# Patient Record
Sex: MALE | Race: WHITE | NOT HISPANIC OR LATINO | URBAN - METROPOLITAN AREA
[De-identification: names, ages, dates, MRNs, and addresses within clinical notes are randomized per-mention and may not be internally consistent; named-entity substitution may affect disease eponyms.]

---

## 2018-10-30 ENCOUNTER — INPATIENT (INPATIENT)
Facility: HOSPITAL | Age: 78
LOS: 1 days | Discharge: HOME | End: 2018-11-01
Attending: INTERNAL MEDICINE | Admitting: INTERNAL MEDICINE

## 2018-10-30 VITALS
HEART RATE: 87 BPM | RESPIRATION RATE: 18 BRPM | SYSTOLIC BLOOD PRESSURE: 143 MMHG | DIASTOLIC BLOOD PRESSURE: 84 MMHG | WEIGHT: 244.93 LBS | OXYGEN SATURATION: 99 % | TEMPERATURE: 96 F

## 2018-10-30 DIAGNOSIS — L03.211 CELLULITIS OF FACE: ICD-10-CM

## 2018-10-30 LAB
ALBUMIN SERPL ELPH-MCNC: 4.5 G/DL — SIGNIFICANT CHANGE UP (ref 3.5–5.2)
ALP SERPL-CCNC: 34 U/L — SIGNIFICANT CHANGE UP (ref 30–115)
ALT FLD-CCNC: 21 U/L — SIGNIFICANT CHANGE UP (ref 0–41)
ANION GAP SERPL CALC-SCNC: 16 MMOL/L — HIGH (ref 7–14)
APTT BLD: 26.4 SEC — LOW (ref 27–39.2)
AST SERPL-CCNC: 46 U/L — HIGH (ref 0–41)
BASE EXCESS BLDV CALC-SCNC: 0.4 MMOL/L — SIGNIFICANT CHANGE UP (ref -2–2)
BASOPHILS # BLD AUTO: 0 K/UL — SIGNIFICANT CHANGE UP (ref 0–0.2)
BASOPHILS NFR BLD AUTO: 0 % — SIGNIFICANT CHANGE UP (ref 0–1)
BILIRUB SERPL-MCNC: 1 MG/DL — SIGNIFICANT CHANGE UP (ref 0.2–1.2)
BUN SERPL-MCNC: 17 MG/DL — SIGNIFICANT CHANGE UP (ref 10–20)
CA-I SERPL-SCNC: 1.3 MMOL/L — SIGNIFICANT CHANGE UP (ref 1.12–1.3)
CALCIUM SERPL-MCNC: 9.7 MG/DL — SIGNIFICANT CHANGE UP (ref 8.5–10.1)
CHLORIDE SERPL-SCNC: 102 MMOL/L — SIGNIFICANT CHANGE UP (ref 98–110)
CO2 SERPL-SCNC: 20 MMOL/L — SIGNIFICANT CHANGE UP (ref 17–32)
CREAT SERPL-MCNC: 1.4 MG/DL — SIGNIFICANT CHANGE UP (ref 0.7–1.5)
EOSINOPHIL # BLD AUTO: 0.04 K/UL — SIGNIFICANT CHANGE UP (ref 0–0.7)
EOSINOPHIL NFR BLD AUTO: 1 % — SIGNIFICANT CHANGE UP (ref 0–8)
GAS PNL BLDV: 137 MMOL/L — SIGNIFICANT CHANGE UP (ref 136–145)
GAS PNL BLDV: SIGNIFICANT CHANGE UP
GLUCOSE SERPL-MCNC: 114 MG/DL — HIGH (ref 70–99)
HCO3 BLDV-SCNC: 25 MMOL/L — SIGNIFICANT CHANGE UP (ref 22–29)
HCT VFR BLD CALC: 43.1 % — SIGNIFICANT CHANGE UP (ref 42–52)
HCT VFR BLDA CALC: 47.6 % — HIGH (ref 34–44)
HGB BLD CALC-MCNC: 15.5 G/DL — SIGNIFICANT CHANGE UP (ref 14–18)
HGB BLD-MCNC: 14.9 G/DL — SIGNIFICANT CHANGE UP (ref 14–18)
HOROWITZ INDEX BLDV+IHG-RTO: 21 — SIGNIFICANT CHANGE UP
INR BLD: 1.22 RATIO — SIGNIFICANT CHANGE UP (ref 0.65–1.3)
LACTATE BLDV-MCNC: 1.7 MMOL/L — HIGH (ref 0.5–1.6)
LACTATE SERPL-SCNC: 1.8 MMOL/L — SIGNIFICANT CHANGE UP (ref 0.5–2.2)
LYMPHOCYTES # BLD AUTO: 0.58 K/UL — LOW (ref 1.2–3.4)
LYMPHOCYTES # BLD AUTO: 14 % — LOW (ref 20.5–51.1)
MCHC RBC-ENTMCNC: 30.3 PG — SIGNIFICANT CHANGE UP (ref 27–31)
MCHC RBC-ENTMCNC: 34.6 G/DL — SIGNIFICANT CHANGE UP (ref 32–37)
MCV RBC AUTO: 87.8 FL — SIGNIFICANT CHANGE UP (ref 80–94)
MONOCYTES # BLD AUTO: 0.37 K/UL — SIGNIFICANT CHANGE UP (ref 0.1–0.6)
MONOCYTES NFR BLD AUTO: 9 % — SIGNIFICANT CHANGE UP (ref 1.7–9.3)
NEUTROPHILS # BLD AUTO: 3.15 K/UL — SIGNIFICANT CHANGE UP (ref 1.4–6.5)
NEUTROPHILS NFR BLD AUTO: 76 % — HIGH (ref 42.2–75.2)
NRBC # BLD: 0 /100 — SIGNIFICANT CHANGE UP (ref 0–0)
NRBC # BLD: SIGNIFICANT CHANGE UP /100 WBCS (ref 0–0)
PCO2 BLDV: 40 MMHG — LOW (ref 41–51)
PH BLDV: 7.4 — SIGNIFICANT CHANGE UP (ref 7.26–7.43)
PLAT MORPH BLD: NORMAL — SIGNIFICANT CHANGE UP
PLATELET # BLD AUTO: 127 K/UL — LOW (ref 130–400)
PO2 BLDV: 36 MMHG — SIGNIFICANT CHANGE UP (ref 20–40)
POTASSIUM BLDV-SCNC: 3.7 MMOL/L — SIGNIFICANT CHANGE UP (ref 3.3–5.6)
POTASSIUM SERPL-MCNC: 5.5 MMOL/L — HIGH (ref 3.5–5)
POTASSIUM SERPL-SCNC: 5.5 MMOL/L — HIGH (ref 3.5–5)
PROT SERPL-MCNC: 7.2 G/DL — SIGNIFICANT CHANGE UP (ref 6–8)
PROTHROM AB SERPL-ACNC: 13.2 SEC — HIGH (ref 9.95–12.87)
RBC # BLD: 4.91 M/UL — SIGNIFICANT CHANGE UP (ref 4.7–6.1)
RBC # FLD: 13.2 % — SIGNIFICANT CHANGE UP (ref 11.5–14.5)
RBC BLD AUTO: NORMAL — SIGNIFICANT CHANGE UP
SAO2 % BLDV: 69 % — SIGNIFICANT CHANGE UP
SODIUM SERPL-SCNC: 138 MMOL/L — SIGNIFICANT CHANGE UP (ref 135–146)
WBC # BLD: 4.14 K/UL — LOW (ref 4.8–10.8)
WBC # FLD AUTO: 4.14 K/UL — LOW (ref 4.8–10.8)

## 2018-10-30 RX ORDER — VANCOMYCIN HCL 1 G
VIAL (EA) INTRAVENOUS
Qty: 0 | Refills: 0 | Status: DISCONTINUED | OUTPATIENT
Start: 2018-10-30 | End: 2018-10-31

## 2018-10-30 RX ORDER — VANCOMYCIN HCL 1 G
1000 VIAL (EA) INTRAVENOUS EVERY 12 HOURS
Qty: 0 | Refills: 0 | Status: DISCONTINUED | OUTPATIENT
Start: 2018-10-31 | End: 2018-10-31

## 2018-10-30 RX ORDER — FENOFIBRATE,MICRONIZED 130 MG
145 CAPSULE ORAL DAILY
Qty: 0 | Refills: 0 | Status: DISCONTINUED | OUTPATIENT
Start: 2018-10-30 | End: 2018-11-01

## 2018-10-30 RX ORDER — TAMSULOSIN HYDROCHLORIDE 0.4 MG/1
0.4 CAPSULE ORAL AT BEDTIME
Qty: 0 | Refills: 0 | Status: DISCONTINUED | OUTPATIENT
Start: 2018-10-30 | End: 2018-11-01

## 2018-10-30 RX ORDER — AMPICILLIN SODIUM AND SULBACTAM SODIUM 250; 125 MG/ML; MG/ML
1.5 INJECTION, POWDER, FOR SUSPENSION INTRAMUSCULAR; INTRAVENOUS ONCE
Qty: 0 | Refills: 0 | Status: COMPLETED | OUTPATIENT
Start: 2018-10-30 | End: 2018-10-30

## 2018-10-30 RX ORDER — SODIUM CHLORIDE 9 MG/ML
1000 INJECTION INTRAMUSCULAR; INTRAVENOUS; SUBCUTANEOUS ONCE
Qty: 0 | Refills: 0 | Status: COMPLETED | OUTPATIENT
Start: 2018-10-30 | End: 2018-10-30

## 2018-10-30 RX ORDER — APIXABAN 2.5 MG/1
2.5 TABLET, FILM COATED ORAL EVERY 12 HOURS
Qty: 0 | Refills: 0 | Status: DISCONTINUED | OUTPATIENT
Start: 2018-10-30 | End: 2018-11-01

## 2018-10-30 RX ORDER — ATORVASTATIN CALCIUM 80 MG/1
10 TABLET, FILM COATED ORAL AT BEDTIME
Qty: 0 | Refills: 0 | Status: DISCONTINUED | OUTPATIENT
Start: 2018-10-30 | End: 2018-11-01

## 2018-10-30 RX ORDER — AMPICILLIN SODIUM AND SULBACTAM SODIUM 250; 125 MG/ML; MG/ML
1.5 INJECTION, POWDER, FOR SUSPENSION INTRAMUSCULAR; INTRAVENOUS EVERY 6 HOURS
Qty: 0 | Refills: 0 | Status: DISCONTINUED | OUTPATIENT
Start: 2018-10-31 | End: 2018-10-31

## 2018-10-30 RX ORDER — AMPICILLIN SODIUM AND SULBACTAM SODIUM 250; 125 MG/ML; MG/ML
INJECTION, POWDER, FOR SUSPENSION INTRAMUSCULAR; INTRAVENOUS
Qty: 0 | Refills: 0 | Status: DISCONTINUED | OUTPATIENT
Start: 2018-10-30 | End: 2018-10-31

## 2018-10-30 RX ORDER — VANCOMYCIN HCL 1 G
1000 VIAL (EA) INTRAVENOUS ONCE
Qty: 0 | Refills: 0 | Status: COMPLETED | OUTPATIENT
Start: 2018-10-30 | End: 2018-10-30

## 2018-10-30 RX ORDER — METOPROLOL TARTRATE 50 MG
25 TABLET ORAL
Qty: 0 | Refills: 0 | Status: DISCONTINUED | OUTPATIENT
Start: 2018-10-30 | End: 2018-11-01

## 2018-10-30 RX ORDER — AMLODIPINE BESYLATE 2.5 MG/1
5 TABLET ORAL DAILY
Qty: 0 | Refills: 0 | Status: DISCONTINUED | OUTPATIENT
Start: 2018-10-30 | End: 2018-11-01

## 2018-10-30 RX ADMIN — SODIUM CHLORIDE 1000 MILLILITER(S): 9 INJECTION INTRAMUSCULAR; INTRAVENOUS; SUBCUTANEOUS at 12:16

## 2018-10-30 RX ADMIN — APIXABAN 2.5 MILLIGRAM(S): 2.5 TABLET, FILM COATED ORAL at 17:57

## 2018-10-30 RX ADMIN — ATORVASTATIN CALCIUM 10 MILLIGRAM(S): 80 TABLET, FILM COATED ORAL at 21:45

## 2018-10-30 RX ADMIN — Medication 25 MILLIGRAM(S): at 17:57

## 2018-10-30 RX ADMIN — Medication 250 MILLIGRAM(S): at 14:46

## 2018-10-30 RX ADMIN — AMPICILLIN SODIUM AND SULBACTAM SODIUM 200 GRAM(S): 250; 125 INJECTION, POWDER, FOR SUSPENSION INTRAMUSCULAR; INTRAVENOUS at 14:46

## 2018-10-30 RX ADMIN — TAMSULOSIN HYDROCHLORIDE 0.4 MILLIGRAM(S): 0.4 CAPSULE ORAL at 21:45

## 2018-10-30 NOTE — ED PROVIDER NOTE - SKIN, MLM
There is an area of erythema with warmth and tenderness to the forehead 6x6 cm which has extended to the bilateral eyelids. Skin normal color for race, warm, dry and intact. No evidence of rash.

## 2018-10-30 NOTE — ED PROVIDER NOTE - PROGRESS NOTE DETAILS
Patient mentating well, in no distress. Discussed lab work, discussed admission for IV abx, patient agrees with plan
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
## 2018-10-30 NOTE — ED PROVIDER NOTE - EYES, MLM
Clear bilaterally, pupils equal, round and reactive to light, EOMI, no diplopia, no pain with eye movement

## 2018-10-30 NOTE — H&P ADULT - NSHPPHYSICALEXAM_GEN_ALL_CORE
GENERAL:  79y/o Male NAD, resting comfortably.  HEAD:  Atraumatic, Normocephalic, well healed abrasions with erythema, +warmth.  EYES: EOMI, PERRLA, conjunctiva and sclera clear + orbital edema b/l  NECK: Supple, No JVD, no cervical lymphadenopathy, non-tender  CHEST/LUNG: Clear to auscultation bilaterally; No wheeze, rhonchi, or rales  HEART: Regular rate and rhythm; S1&S2  ABDOMEN: Soft, Nontender, Nondistended x 4 quadrants; Bowel sounds present  EXTREMITIES:   Peripheral Pulses Present, No clubbing, no cyanosis, or no edema, no calf tenderness  PSYCH: AAOx3, cooperative, appropriate  NEUROLOGY: WNL  SKIN: There is an area of erythema with warmth and tenderness to the forehead 6x6 cm which has extended to the bilateral eyelids. Skin normal color for race, warm, dry and intact. No evidence of rash. GENERAL:  79y/o Male NAD, resting comfortably.  HEAD:  Atraumatic, Normocephalic, well healed abrasions with erythema, +warmth.  EYES: EOMI, PERRLA, conjunctiva and sclera clear + orbital edema b/l  NECK: Supple, No JVD, no cervical lymphadenopathy, non-tender  CHEST/LUNG: Clear to auscultation bilaterally; No wheeze, rhonchi, or rales  CV/HEART: Regular rate and rhythm; S1&S2  GI/ABDOMEN: Soft, Nontender, Nondistended x 4 quadrants; Bowel sounds present  EXTREMITIES:   Peripheral Pulses Present, No clubbing, no cyanosis, or no edema, no calf tenderness  PSYCH: AAOx3, cooperative, appropriate  NEUROLOGY: WNL  SKIN: There is an area of erythema with warmth and tenderness to the forehead 6x6 cm which has extended to the bilateral eyelids. Skin normal color for race, warm, dry and intact. No evidence of rash.

## 2018-10-30 NOTE — H&P ADULT - NSHPLABSRESULTS_GEN_ALL_CORE
14.9   4.14  )-----------( 127      ( 30 Oct 2018 11:00 )             43.1       10-30    138  |  102  |  17  ----------------------------<  114<H>  5.5<H>   |  20  |  1.4    Ca    9.7      30 Oct 2018 11:00    TPro  7.2  /  Alb  4.5  /  TBili  1.0  /  DBili  x   /  AST  46<H>  /  ALT  21  /  AlkPhos  34  10-30          PT/INR - ( 30 Oct 2018 11:00 )   PT: 13.20 sec;   INR: 1.22 ratio         PTT - ( 30 Oct 2018 11:00 )  PTT:26.4 sec    Lactate Trend  10-30 @ 11:00 Lactate:1.8     < from: CT Head No Cont (10.30.18 @ 13:37) >      IMPRESSION:     1.  Mild frontal extra calvarial soft tissue swelling with no acute   underlying fractures or dislocations.     2.  Periventricular and subcortical white matter chronic small vessel   ischemic changes.    3.  No acute mass effect, midline shift or hemorrhage.        LULU NGUYỄN M.D., ATTENDING RADIOLOGIST  This document has been electronically signed. Oct 30 2018  1:46PM    < from: CT Maxillofacial w/ IV Cont (10.30.18 @ 13:37) >    IMPRESSION:    1.  Mild frontal extracalvarial soft tissue swelling with no acute   underlying fractures or dislocations.    2.  Mild mucosal thickening left maxillary sinus otherwise clear   paranasal sinuses.      LULU NGUYỄN M.D., ATTENDING RADIOLOGIST  This document has been electronically signed. Oct 30 2018  1:45PM

## 2018-10-30 NOTE — PATIENT PROFILE ADULT - ABILITY TO HEAR (WITH HEARING AID OR HEARING APPLIANCE IF NORMALLY USED):
Bilat hearing aids in place/Mildly to Moderately Impaired: difficulty hearing in some environments or speaker may need to increase volume or speak distinctly

## 2018-10-30 NOTE — ED PROCEDURE NOTE - CPROC ED INFUS LINE DETAIL1
The location was identified, and the area was draped and prepped./The catheter was placed using sterile technique./ultrasound guided

## 2018-10-30 NOTE — H&P ADULT - ATTENDING COMMENTS
patient seen and examined at bedside independently. I agree with PA note and findings     1) Facial cellulitis  -IV antibiotics  -ID consult  -pain control      2) CKD stage III   -suspected CKD stage III; outpatient kidney monitoring and nephrology follow up     DVT and GI ppx

## 2018-10-30 NOTE — ED ADULT NURSE NOTE - PMH
Atrial fibrillation    BPH (benign prostatic hyperplasia)    History of lymphoma    Hyperlipidemia    Hypertension

## 2018-10-30 NOTE — ED ADULT NURSE REASSESSMENT NOTE - NS ED NURSE REASSESS COMMENT FT1
Unable to start IV after multiple attempts. MD aware, will try to start IV using ultrasound. Blood drawn and sent to lab. Will follow up

## 2018-10-30 NOTE — ED ADULT NURSE NOTE - NSIMPLEMENTINTERV_GEN_ALL_ED
Implemented All Universal Safety Interventions:  [place] to call system. Call bell, personal items and telephone within reach. Instruct patient to call for assistance. Room bathroom lighting operational. Non-slip footwear when patient is off stretcher. Physically safe environment: no spills, clutter or unnecessary equipment. Stretcher in lowest position, wheels locked, appropriate side rails in place.

## 2018-10-31 DIAGNOSIS — N18.9 CHRONIC KIDNEY DISEASE, UNSPECIFIED: ICD-10-CM

## 2018-10-31 DIAGNOSIS — I48.2 CHRONIC ATRIAL FIBRILLATION: ICD-10-CM

## 2018-10-31 DIAGNOSIS — I10 ESSENTIAL (PRIMARY) HYPERTENSION: ICD-10-CM

## 2018-10-31 DIAGNOSIS — E78.5 HYPERLIPIDEMIA, UNSPECIFIED: ICD-10-CM

## 2018-10-31 DIAGNOSIS — N40.0 BENIGN PROSTATIC HYPERPLASIA WITHOUT LOWER URINARY TRACT SYMPTOMS: ICD-10-CM

## 2018-10-31 LAB
ANION GAP SERPL CALC-SCNC: 13 MMOL/L — SIGNIFICANT CHANGE UP (ref 7–14)
APTT BLD: 29.9 SEC — SIGNIFICANT CHANGE UP (ref 27–39.2)
BUN SERPL-MCNC: 16 MG/DL — SIGNIFICANT CHANGE UP (ref 10–20)
CALCIUM SERPL-MCNC: 9 MG/DL — SIGNIFICANT CHANGE UP (ref 8.5–10.1)
CHLORIDE SERPL-SCNC: 105 MMOL/L — SIGNIFICANT CHANGE UP (ref 98–110)
CO2 SERPL-SCNC: 20 MMOL/L — SIGNIFICANT CHANGE UP (ref 17–32)
CREAT SERPL-MCNC: 1.1 MG/DL — SIGNIFICANT CHANGE UP (ref 0.7–1.5)
GLUCOSE SERPL-MCNC: 125 MG/DL — HIGH (ref 70–99)
HCT VFR BLD CALC: 37.7 % — LOW (ref 42–52)
HGB BLD-MCNC: 12.7 G/DL — LOW (ref 14–18)
INR BLD: 1.24 RATIO — SIGNIFICANT CHANGE UP (ref 0.65–1.3)
MCHC RBC-ENTMCNC: 30.2 PG — SIGNIFICANT CHANGE UP (ref 27–31)
MCHC RBC-ENTMCNC: 33.7 G/DL — SIGNIFICANT CHANGE UP (ref 32–37)
MCV RBC AUTO: 89.5 FL — SIGNIFICANT CHANGE UP (ref 80–94)
NRBC # BLD: 0 /100 WBCS — SIGNIFICANT CHANGE UP (ref 0–0)
PLATELET # BLD AUTO: 114 K/UL — LOW (ref 130–400)
POTASSIUM SERPL-MCNC: 4.6 MMOL/L — SIGNIFICANT CHANGE UP (ref 3.5–5)
POTASSIUM SERPL-SCNC: 4.6 MMOL/L — SIGNIFICANT CHANGE UP (ref 3.5–5)
PROTHROM AB SERPL-ACNC: 13.5 SEC — HIGH (ref 9.95–12.87)
RBC # BLD: 4.21 M/UL — LOW (ref 4.7–6.1)
RBC # FLD: 13.2 % — SIGNIFICANT CHANGE UP (ref 11.5–14.5)
SODIUM SERPL-SCNC: 138 MMOL/L — SIGNIFICANT CHANGE UP (ref 135–146)
WBC # BLD: 4.23 K/UL — LOW (ref 4.8–10.8)
WBC # FLD AUTO: 4.23 K/UL — LOW (ref 4.8–10.8)

## 2018-10-31 RX ORDER — DIPHENHYDRAMINE HCL 50 MG
25 CAPSULE ORAL ONCE
Qty: 0 | Refills: 0 | Status: COMPLETED | OUTPATIENT
Start: 2018-10-31 | End: 2018-10-31

## 2018-10-31 RX ORDER — VANCOMYCIN HCL 1 G
1500 VIAL (EA) INTRAVENOUS EVERY 12 HOURS
Qty: 0 | Refills: 0 | Status: DISCONTINUED | OUTPATIENT
Start: 2018-10-31 | End: 2018-11-01

## 2018-10-31 RX ORDER — DIPHENHYDRAMINE HCL 50 MG
25 CAPSULE ORAL EVERY 4 HOURS
Qty: 0 | Refills: 0 | Status: DISCONTINUED | OUTPATIENT
Start: 2018-10-31 | End: 2018-11-01

## 2018-10-31 RX ADMIN — Medication 100 MILLIGRAM(S): at 21:32

## 2018-10-31 RX ADMIN — AMPICILLIN SODIUM AND SULBACTAM SODIUM 100 GRAM(S): 250; 125 INJECTION, POWDER, FOR SUSPENSION INTRAMUSCULAR; INTRAVENOUS at 00:55

## 2018-10-31 RX ADMIN — Medication 25 MILLIGRAM(S): at 05:58

## 2018-10-31 RX ADMIN — Medication 145 MILLIGRAM(S): at 12:41

## 2018-10-31 RX ADMIN — APIXABAN 2.5 MILLIGRAM(S): 2.5 TABLET, FILM COATED ORAL at 05:58

## 2018-10-31 RX ADMIN — TAMSULOSIN HYDROCHLORIDE 0.4 MILLIGRAM(S): 0.4 CAPSULE ORAL at 21:32

## 2018-10-31 RX ADMIN — Medication 25 MILLIGRAM(S): at 23:05

## 2018-10-31 RX ADMIN — ATORVASTATIN CALCIUM 10 MILLIGRAM(S): 80 TABLET, FILM COATED ORAL at 21:32

## 2018-10-31 RX ADMIN — Medication 100 MILLIGRAM(S): at 17:56

## 2018-10-31 RX ADMIN — Medication 300 MILLIGRAM(S): at 17:57

## 2018-10-31 RX ADMIN — APIXABAN 2.5 MILLIGRAM(S): 2.5 TABLET, FILM COATED ORAL at 17:17

## 2018-10-31 RX ADMIN — Medication 250 MILLIGRAM(S): at 06:38

## 2018-10-31 RX ADMIN — AMPICILLIN SODIUM AND SULBACTAM SODIUM 100 GRAM(S): 250; 125 INJECTION, POWDER, FOR SUSPENSION INTRAMUSCULAR; INTRAVENOUS at 05:59

## 2018-10-31 RX ADMIN — AMPICILLIN SODIUM AND SULBACTAM SODIUM 100 GRAM(S): 250; 125 INJECTION, POWDER, FOR SUSPENSION INTRAMUSCULAR; INTRAVENOUS at 13:31

## 2018-10-31 RX ADMIN — AMLODIPINE BESYLATE 5 MILLIGRAM(S): 2.5 TABLET ORAL at 05:58

## 2018-10-31 RX ADMIN — Medication 25 MILLIGRAM(S): at 17:17

## 2018-10-31 NOTE — PROGRESS NOTE ADULT - SUBJECTIVE AND OBJECTIVE BOX
PATTY GILLILAND  78y  Male      Patient is a 78y old  Male who presents with a chief complaint of Cellulitis (30 Oct 2018 14:49)      INTERVAL HPI/OVERNIGHT EVENTS:    REVIEW OF SYSTEMS:  CONSTITUTIONAL: No fever, weight loss, or fatigue  EYES: No eye pain, visual disturbances, or discharge  NECK: No pain or stiffness  RESPIRATORY: No cough, wheezing, chills or hemoptysis; No shortness of breath  CARDIOVASCULAR: No chest pain, palpitations, dizziness, or leg swelling  GASTROINTESTINAL: No abdominal or epigastric pain. No nausea, vomiting, or hematemesis; No diarrhea or constipation. No melena or hematochezia.  GENITOURINARY: No dysuria, frequency, hematuria, or incontinence  NEUROLOGICAL: No headaches, memory loss, loss of strength, numbness, or tremors  MUSCULOSKELETAL: No joint pain or swelling; No muscle, back, or extremity pain    T(C): 35.7 (10-31-18 @ 05:41), Max: 35.7 (10-30-18 @ 16:20)  HR: 106 (10-31-18 @ 05:41) (88 - 106)  BP: 157/72 (10-31-18 @ 05:41) (134/93 - 157/72)  RR: 18 (10-31-18 @ 05:41) (18 - 18)  SpO2: 98% (10-30-18 @ 16:20) (98% - 99%)  Wt(kg): --Vital Signs Last 24 Hrs  T(C): 35.7 (31 Oct 2018 05:41), Max: 35.7 (30 Oct 2018 16:20)  T(F): 96.3 (31 Oct 2018 05:41), Max: 96.3 (31 Oct 2018 05:41)  HR: 106 (31 Oct 2018 05:41) (88 - 106)  BP: 157/72 (31 Oct 2018 05:41) (134/93 - 157/72)  BP(mean): --  RR: 18 (31 Oct 2018 05:41) (18 - 18)  SpO2: 98% (30 Oct 2018 16:20) (98% - 99%)    PHYSICAL EXAM:  GENERAL: NAD, well-groomed, well-developed  HEAD:  Atraumatic, Normocephalic  EYES: EOMI, PERRLA, conjunctiva and sclera clear  NERVOUS SYSTEM:  Alert & Oriented X 4, Good concentration; Motor Strength 5/5 B/L upper and lower extremities; DTRs 2+ intact and symmetric  CHEST/LUNG: Clear to percussion bilaterally; No rales, rhonchi, wheezing, or rubs  HEART: Regular rate and rhythm; No murmurs, rubs, or gallops  ABDOMEN: Soft, Nontender, Nondistended; Bowel sounds present  EXTREMITIES:  2+ Peripheral Pulses, No clubbing, cyanosis, or edema  SKIN: No rashes or lesions    LAB:                        12.7   4.23  )-----------( 114      ( 31 Oct 2018 07:22 )             37.7     10-    138  |  105  |  16  ----------------------------<  125<H>  4.6   |  20  |  1.1    Ca    9.0      31 Oct 2018 07:22    TPro  7.2  /  Alb  4.5  /  TBili  1.0  /  DBili  x   /  AST  46<H>  /  ALT  21  /  AlkPhos  34  10-30        Daily Height in cm: 180.34 (30 Oct 2018 16:20)    Daily Weight in k.4 (30 Oct 2018 16:20)  CAPILLARY BLOOD GLUCOSE    LIVER FUNCTIONS - ( 30 Oct 2018 11:00 )  Alb: 4.5 g/dL / Pro: 7.2 g/dL / ALK PHOS: 34 U/L / ALT: 21 U/L / AST: 46 U/L / GGT: x           RADIOLOGY:    Imaging Personally Reviewed:  [ Y ] YES  [ ] NO    HEALTH ISSUES - PROBLEM Dx:  Facial cellulitis: Facial cellulitis          amLODIPine   Tablet 5 milliGRAM(s) Oral daily  ampicillin/sulbactam  IVPB      ampicillin/sulbactam  IVPB 1.5 Gram(s) IV Intermittent every 6 hours  apixaban 2.5 milliGRAM(s) Oral every 12 hours  atorvastatin 10 milliGRAM(s) Oral at bedtime  fenofibrate Tablet 145 milliGRAM(s) Oral daily  metoprolol tartrate 25 milliGRAM(s) Oral two times a day  tamsulosin 0.4 milliGRAM(s) Oral at bedtime  vancomycin  IVPB      vancomycin  IVPB 1000 milliGRAM(s) IV Intermittent every 12 hours PATTY GILLILAND  78y  Male      Patient is a 78y old  Male who presents with a chief complaint of Cellulitis (30 Oct 2018 14:49)      INTERVAL HPI/OVERNIGHT EVENTS:  patient is admitted for facial cellulitis, on IV antibiotics, await ID consult  -continue with inpatient monitoring   -spoke to family at bedside     -Vital Signs Last 24 Hrs  T(C): 35.7 (31 Oct 2018 05:41), Max: 35.7 (30 Oct 2018 16:20)  T(F): 96.3 (31 Oct 2018 05:41), Max: 96.3 (31 Oct 2018 05:41)  HR: 106 (31 Oct 2018 05:41) (88 - 106)  BP: 157/72 (31 Oct 2018 05:41) (134/93 - 157/72)  RR: 18 (31 Oct 2018 05:41) (18 - 18)  SpO2: 98% (30 Oct 2018 16:20) (98% - 99%)    PHYSICAL EXAM:  GENERAL: NAD, well-groomed, well-developed, speaking in full sentences   HEAD: swelling and scratches on face   EYES: orbital swelling   NERVOUS SYSTEM:  Alert & Oriented x 3  CHEST/LUNG: Clear to percussion bilaterally; No rales, rhonchi, wheezing, or rubs  CV/HEART: Regular rate and rhythm; No murmurs, rubs, or gallops  GI/ABDOMEN: Soft, Nontender, Nondistended; Bowel sounds present  EXTREMITIES:  2+ Peripheral Pulses, No clubbing, cyanosis, or edema  SKIN: No rashes or lesions    LAB:                        12.7   4.23  )-----------( 114      ( 31 Oct 2018 07:22 )             37.7     10-    138  |  105  |  16  ----------------------------<  125<H>  4.6   |  20  |  1.1    Ca    9.0      31 Oct 2018 07:22    TPro  7.2  /  Alb  4.5  /  TBili  1.0  /  DBili  x   /  AST  46<H>  /  ALT  21  /  AlkPhos  34  10-30        Daily Height in cm: 180.34 (30 Oct 2018 16:20)    Daily Weight in k.4 (30 Oct 2018 16:20)  CAPILLARY BLOOD GLUCOSE    LIVER FUNCTIONS - ( 30 Oct 2018 11:00 )  Alb: 4.5 g/dL / Pro: 7.2 g/dL / ALK PHOS: 34 U/L / ALT: 21 U/L / AST: 46 U/L / GGT: x           RADIOLOGY:    Imaging Personally Reviewed:  [ Y ] YES  [ ] NO    HEALTH ISSUES - PROBLEM Dx:  Facial cellulitis: Facial cellulitis    MEDS:  amLODIPine   Tablet 5 milliGRAM(s) Oral daily  ampicillin/sulbactam  IVPB      ampicillin/sulbactam  IVPB 1.5 Gram(s) IV Intermittent every 6 hours  apixaban 2.5 milliGRAM(s) Oral every 12 hours  atorvastatin 10 milliGRAM(s) Oral at bedtime  fenofibrate Tablet 145 milliGRAM(s) Oral daily  metoprolol tartrate 25 milliGRAM(s) Oral two times a day  tamsulosin 0.4 milliGRAM(s) Oral at bedtime  vancomycin  IVPB      vancomycin  IVPB 1000 milliGRAM(s) IV Intermittent every 12 hours

## 2018-10-31 NOTE — CONSULT NOTE ADULT - PROBLEM SELECTOR RECOMMENDATION 9
likely staph  IV VAnco and Clinda    DC planning - PO BACtrim DS 2 tabs Q12 + PO Clinda 300 mg Q 6 -   likely 10-14 days of abx     warm compresses

## 2018-10-31 NOTE — PROGRESS NOTE ADULT - SUBJECTIVE AND OBJECTIVE BOX
Patient says facial swelling is worse and the areas feel itchy, antibiotics were changed today. States warm compresses not helping. Seen at bedside, there is mild swelling (left more then right) with apparently new puffiness to bilateral eye lids. No shortness of breath. At this point I told patient I will be examing him frequently overnight and if swelling continues to worsen, will further intervene with likelihood of repeat CT scan, possible medication/antibiotic changes.
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
## 2018-10-31 NOTE — PROGRESS NOTE ADULT - PROBLEM SELECTOR PLAN 1
admit med/surg  IV abx  continue home meds  monitor v/s, f/u labs  id consult -IV antibiotics; check vanco levels  -pain control prn   -ID consult

## 2018-11-01 VITALS — RESPIRATION RATE: 16 BRPM | HEART RATE: 104 BPM | DIASTOLIC BLOOD PRESSURE: 84 MMHG | SYSTOLIC BLOOD PRESSURE: 138 MMHG

## 2018-11-01 LAB
ANION GAP SERPL CALC-SCNC: 10 MMOL/L — SIGNIFICANT CHANGE UP (ref 7–14)
BUN SERPL-MCNC: 15 MG/DL — SIGNIFICANT CHANGE UP (ref 10–20)
CALCIUM SERPL-MCNC: 9.6 MG/DL — SIGNIFICANT CHANGE UP (ref 8.5–10.1)
CHLORIDE SERPL-SCNC: 104 MMOL/L — SIGNIFICANT CHANGE UP (ref 98–110)
CO2 SERPL-SCNC: 25 MMOL/L — SIGNIFICANT CHANGE UP (ref 17–32)
CREAT SERPL-MCNC: 1.2 MG/DL — SIGNIFICANT CHANGE UP (ref 0.7–1.5)
GLUCOSE SERPL-MCNC: 113 MG/DL — HIGH (ref 70–99)
HCT VFR BLD CALC: 36.1 % — LOW (ref 42–52)
HGB BLD-MCNC: 12.2 G/DL — LOW (ref 14–18)
MCHC RBC-ENTMCNC: 29.8 PG — SIGNIFICANT CHANGE UP (ref 27–31)
MCHC RBC-ENTMCNC: 33.8 G/DL — SIGNIFICANT CHANGE UP (ref 32–37)
MCV RBC AUTO: 88.3 FL — SIGNIFICANT CHANGE UP (ref 80–94)
NRBC # BLD: 0 /100 WBCS — SIGNIFICANT CHANGE UP (ref 0–0)
PLATELET # BLD AUTO: 122 K/UL — LOW (ref 130–400)
POTASSIUM SERPL-MCNC: 4.2 MMOL/L — SIGNIFICANT CHANGE UP (ref 3.5–5)
POTASSIUM SERPL-SCNC: 4.2 MMOL/L — SIGNIFICANT CHANGE UP (ref 3.5–5)
RBC # BLD: 4.09 M/UL — LOW (ref 4.7–6.1)
RBC # FLD: 13.2 % — SIGNIFICANT CHANGE UP (ref 11.5–14.5)
SODIUM SERPL-SCNC: 139 MMOL/L — SIGNIFICANT CHANGE UP (ref 135–146)
VANCOMYCIN TROUGH SERPL-MCNC: 9.4 UG/ML — SIGNIFICANT CHANGE UP (ref 5–10)
WBC # BLD: 4.79 K/UL — LOW (ref 4.8–10.8)
WBC # FLD AUTO: 4.79 K/UL — LOW (ref 4.8–10.8)

## 2018-11-01 RX ORDER — LACTOBACILLUS ACIDOPHILUS 100MM CELL
1 CAPSULE ORAL
Qty: 0 | Refills: 0 | Status: DISCONTINUED | OUTPATIENT
Start: 2018-11-01 | End: 2018-11-01

## 2018-11-01 RX ORDER — LACTOBACILLUS ACIDOPHILUS 100MM CELL
0 CAPSULE ORAL
Qty: 0 | Refills: 0 | DISCHARGE
Start: 2018-11-01

## 2018-11-01 RX ADMIN — Medication 2 TABLET(S): at 10:07

## 2018-11-01 RX ADMIN — AMLODIPINE BESYLATE 5 MILLIGRAM(S): 2.5 TABLET ORAL at 05:43

## 2018-11-01 RX ADMIN — Medication 300 MILLIGRAM(S): at 06:24

## 2018-11-01 RX ADMIN — Medication 25 MILLIGRAM(S): at 05:43

## 2018-11-01 RX ADMIN — APIXABAN 2.5 MILLIGRAM(S): 2.5 TABLET, FILM COATED ORAL at 05:43

## 2018-11-01 RX ADMIN — Medication 100 MILLIGRAM(S): at 10:08

## 2018-11-01 NOTE — DISCHARGE NOTE ADULT - MEDICATION SUMMARY - MEDICATIONS TO TAKE
I will START or STAY ON the medications listed below when I get home from the hospital:    tamsulosin 0.4 mg oral capsule  -- 1 cap(s) by mouth once a day  -- Indication: For BPH (benign prostatic hyperplasia)    Eliquis 2.5 mg oral tablet  -- 1 tab(s) by mouth 2 times a day  -- Indication: For Atrial fibrillation    atorvastatin 10 mg oral tablet  -- 1 tab(s) by mouth once a day  -- Indication: For Hyperlipidemia    fenofibric acid 135 mg oral delayed release capsule  -- 1 cap(s) by mouth once a day  -- Indication: For Hyperlipidemia    doxycycline monohydrate 100 mg oral capsule  -- 1 cap(s) by mouth every 12 hours for 10 days  -- Indication: For Facial cellulitis    Metoprolol Tartrate 25 mg oral tablet  -- 1 tab(s) by mouth 2 times a day  -- Indication: For Hypertension    amLODIPine 5 mg oral tablet  -- 1 tab(s) by mouth once a day  -- Indication: For Hypertension    lactobacillus acidophilus oral capsule  --  orally, over the counter probiotics   -- Indication: For while on antibiotics     sulfamethoxazole-trimethoprim 800 mg-160 mg oral tablet  -- 2 tab(s) by mouth every 12 hours for 10 days  -- Indication: For Facial cellulitis

## 2018-11-01 NOTE — DISCHARGE NOTE ADULT - HOSPITAL COURSE
***patient seen and examined at bedside. Spent over 40mins d/c planning, d/c papers and med rec ***    patient is sitting up in chair, dressed up ready to go home   -ID follow up noted; patient can be discharged home on oral antibiotics     Vital Signs Last 24 Hrs  T(C): 37.1 (01 Nov 2018 05:43), Max: 37.1 (01 Nov 2018 05:43)  T(F): 98.7 (01 Nov 2018 05:43), Max: 98.7 (01 Nov 2018 05:43)  HR: 104 (01 Nov 2018 07:49) (95 - 104)  BP: 138/84 (01 Nov 2018 07:49) (125/73 - 169/70)  BP(mean): --  RR: 16 (01 Nov 2018 07:49) (16 - 18)  SpO2: 98% (01 Nov 2018 00:20) (98% - 98%)    79 y/o male orbital/facial cellulitis     Problem/Plan - 1:  ·  Problem: Facial cellulitis.  Plan: -IV antibiotics; overnight events noted, benadryl given   -switched to oral antibiotics   -pain control prn   -ID consult noted     Problem/Plan - 2:  ·  Problem: Chronic kidney disease.  Plan: suspected CKD stage II  -outpatient kidney monitoring and nephrology follow up for appropriate staging      Problem/Plan - 3:  ·  Problem: BPH (benign prostatic hyperplasia).  Plan: -on flomax.      Problem/Plan - 4:  ·  Problem: Hypertension.  Plan: -continue with home meds.      Problem/Plan - 5:  ·  Problem: Hyperlipidemia.  Plan: -home meds.      Problem/Plan - 6:  Problem: Chronic atrial fibrillation. Plan: -on long term anticoagulant.    family at bedside and patient can be discharged home today   outpatient follow up with PMD

## 2018-11-01 NOTE — DISCHARGE NOTE ADULT - PATIENT PORTAL LINK FT
You can access the [facility], offered by , by registering with the following website: [email or web address]

## 2018-11-01 NOTE — PROGRESS NOTE ADULT - SUBJECTIVE AND OBJECTIVE BOX
PATTY GILLILAND 78yMalePatient is a 78y old  Male who presents with a chief complaint of Cellulitis (01 Nov 2018 06:24)      Patient has history of:  clindamycin (Pruritus)    PMH - not relevant     FSH - not relevant     ROS - itchy swelling to face with Clindamycin     Patient treated with:  vancomycin  IVPB 1500 milliGRAM(s) IV Intermittent every 12 hours      PHYSICAL EXAM  T(F): 98.7 (11-01-18 @ 05:43), Max: 98.7 (11-01-18 @ 05:43)  HR: 104 (11-01-18 @ 05:43) (95 - 104)  BP: 129/64 (11-01-18 @ 05:43) (125/73 - 169/70)  RR: 18 (11-01-18 @ 05:43) (16 - 18)  SpO2: 98% (11-01-18 @ 00:20) (98% - 98%)  Daily     Daily     HEENT: facial swelling less - erythema less. no nuchal rigidity  Cor: RSR Nl S1 S2  Lungs: clear  Abdomen: Nontender, Nl BS,   Ext: No phlebitis     LAB & RADIOLOGIC RESULTS:                        12.2   4.79  )-----------( 122      ( 01 Nov 2018 06:25 )             36.1         10-31    138  |  105  |  16  ----------------------------<  125<H>  4.6   |  20  |  1.1    Ca    9.0      31 Oct 2018 07:22    TPro  7.2  /  Alb  4.5  /  TBili  1.0  /  DBili  x   /  AST  46<H>  /  ALT  21  /  AlkPhos  34  10-30      LIVER FUNCTIONS - ( 30 Oct 2018 11:00 )  Alb: 4.5 g/dL / Pro: 7.2 g/dL / ALK PHOS: 34 U/L / ALT: 21 U/L / AST: 46 U/L / GGT: x           PT/INR - ( 31 Oct 2018 07:22 )   PT: 13.50 sec;   INR: 1.24 ratio         PTT - ( 31 Oct 2018 07:22 )  PTT:29.9 sec  Consult Note Adult-Infectious Disease Attending [HUNTER Rodriguez] (10-31-18 @ 16:15)  Progress Note Adult-Hospitalist Attending [JORGE Hall] (10-31-18 @ 13:08)  Patient Profile Adult [LUCERO Mckeon] (10-30-18 @ 16:22)  ED ADULT Nurse Reassessment Note [LUCERO Fang] (10-30-18 @ 15:26)  H&P Adult [LUCERO MuhammadJORGE] (10-30-18 @ 14:49)  ED Provider Note [RENUKA Lozano] (10-30-18 @ 13:28)  ED ADULT Nurse Reassessment Note [CAMERON Dodd] (10-30-18 @ 11:51)  ED ADULT Nurse Note [CAMERON Whitaker, JORGE Campbell] (10-30-18 @ 10:00)  ED ADULT Triage Note [JORGE Campbell] (10-30-18 @ 09:58    Culture - Blood (collected 10-30-18 @ 10:27)  Source: .Blood Blood  Preliminary Report (11-01-18 @ 01:01):    No growth to date.    Culture - Blood (collected 10-30-18 @ 10:27)  Source: .Blood Blood  Preliminary Report (11-01-18 @ 01:01):    No growth to date.

## 2018-11-01 NOTE — CHART NOTE - NSCHARTNOTEFT_GEN_A_CORE
· Hospital Course	  ***patient seen and examined at bedside. Spent over 40mins d/c planning, d/c papers and med rec ***    patient is sitting up in chair, dressed up ready to go home   -ID follow up noted; patient can be discharged home on oral antibiotics     Vital Signs Last 24 Hrs  T(C): 37.1 (01 Nov 2018 05:43), Max: 37.1 (01 Nov 2018 05:43)  T(F): 98.7 (01 Nov 2018 05:43), Max: 98.7 (01 Nov 2018 05:43)  HR: 104 (01 Nov 2018 07:49) (95 - 104)  BP: 138/84 (01 Nov 2018 07:49) (125/73 - 169/70)  BP(mean): --  RR: 16 (01 Nov 2018 07:49) (16 - 18)  SpO2: 98% (01 Nov 2018 00:20) (98% - 98%)    77 y/o male orbital/facial cellulitis     Problem/Plan - 1:  ·  Problem: Facial cellulitis.  Plan: -IV antibiotics; overnight events noted, benadryl given   -switched to oral antibiotics   -pain control prn   -ID consult noted     Problem/Plan - 2:  ·  Problem: Chronic kidney disease.  Plan: suspected CKD stage II  -outpatient kidney monitoring and nephrology follow up for appropriate staging      Problem/Plan - 3:  ·  Problem: BPH (benign prostatic hyperplasia).  Plan: -on flomax.      Problem/Plan - 4:  ·  Problem: Hypertension.  Plan: -continue with home meds.      Problem/Plan - 5:  ·  Problem: Hyperlipidemia.  Plan: -home meds.      Problem/Plan - 6:  Problem: Chronic atrial fibrillation. Plan: -on long term anticoagulant.    family at bedside and patient can be discharged home today   outpatient follow up with PMD

## 2018-11-01 NOTE — DISCHARGE NOTE ADULT - CARE PLAN
Principal Discharge DX:	Facial cellulitis  Goal:	to be symptom free  Assessment and plan of treatment:	-finish your antibiotics as prescribed   -outpatient follow up with PMD

## 2018-11-01 NOTE — DISCHARGE NOTE ADULT - CARE PROVIDER_API CALL
Jerri Peterson), Internal Medicine  [address] [place] [address]  Phone: [phone]  Fax: [phone]

## 2018-11-01 NOTE — PROGRESS NOTE ADULT - PROBLEM SELECTOR PLAN 1
Rxn to Clindamycin    DC planning    PO BActrim DS 2 tabs Q12 + PO Doxycycline 100 mg Q12 - 10 days   recall if needed

## 2018-11-04 LAB
CULTURE RESULTS: SIGNIFICANT CHANGE UP
CULTURE RESULTS: SIGNIFICANT CHANGE UP
SPECIMEN SOURCE: SIGNIFICANT CHANGE UP
SPECIMEN SOURCE: SIGNIFICANT CHANGE UP

## 2018-11-07 DIAGNOSIS — H05.013 CELLULITIS OF BILATERAL ORBITS: ICD-10-CM

## 2018-11-07 DIAGNOSIS — N40.0 BENIGN PROSTATIC HYPERPLASIA WITHOUT LOWER URINARY TRACT SYMPTOMS: ICD-10-CM

## 2018-11-07 DIAGNOSIS — L29.8 OTHER PRURITUS: ICD-10-CM

## 2018-11-07 DIAGNOSIS — T36.8X5A ADVERSE EFFECT OF OTHER SYSTEMIC ANTIBIOTICS, INITIAL ENCOUNTER: ICD-10-CM

## 2018-11-07 DIAGNOSIS — Z85.72 PERSONAL HISTORY OF NON-HODGKIN LYMPHOMAS: ICD-10-CM

## 2018-11-07 DIAGNOSIS — I96 GANGRENE, NOT ELSEWHERE CLASSIFIED: ICD-10-CM

## 2018-11-07 DIAGNOSIS — Z79.01 LONG TERM (CURRENT) USE OF ANTICOAGULANTS: ICD-10-CM

## 2018-11-07 DIAGNOSIS — I12.9 HYPERTENSIVE CHRONIC KIDNEY DISEASE WITH STAGE 1 THROUGH STAGE 4 CHRONIC KIDNEY DISEASE, OR UNSPECIFIED CHRONIC KIDNEY DISEASE: ICD-10-CM

## 2018-11-07 DIAGNOSIS — L03.211 CELLULITIS OF FACE: ICD-10-CM

## 2018-11-07 DIAGNOSIS — B95.8 UNSPECIFIED STAPHYLOCOCCUS AS THE CAUSE OF DISEASES CLASSIFIED ELSEWHERE: ICD-10-CM

## 2018-11-07 DIAGNOSIS — N18.2 CHRONIC KIDNEY DISEASE, STAGE 2 (MILD): ICD-10-CM

## 2018-11-07 DIAGNOSIS — E78.5 HYPERLIPIDEMIA, UNSPECIFIED: ICD-10-CM

## 2018-11-07 DIAGNOSIS — I48.2 CHRONIC ATRIAL FIBRILLATION: ICD-10-CM

## 2019-04-18 ENCOUNTER — EMERGENCY (EMERGENCY)
Facility: HOSPITAL | Age: 79
LOS: 0 days | Discharge: HOME | End: 2019-04-18
Attending: EMERGENCY MEDICINE | Admitting: EMERGENCY MEDICINE
Payer: COMMERCIAL

## 2019-04-18 VITALS
TEMPERATURE: 97 F | RESPIRATION RATE: 16 BRPM | SYSTOLIC BLOOD PRESSURE: 184 MMHG | DIASTOLIC BLOOD PRESSURE: 84 MMHG | HEART RATE: 80 BPM | OXYGEN SATURATION: 98 %

## 2019-04-18 VITALS
TEMPERATURE: 99 F | HEART RATE: 78 BPM | DIASTOLIC BLOOD PRESSURE: 78 MMHG | RESPIRATION RATE: 18 BRPM | OXYGEN SATURATION: 99 % | SYSTOLIC BLOOD PRESSURE: 112 MMHG
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
## 2019-04-18 DIAGNOSIS — S09.90XA UNSPECIFIED INJURY OF HEAD, INITIAL ENCOUNTER: ICD-10-CM

## 2019-04-18 DIAGNOSIS — Z79.899 OTHER LONG TERM (CURRENT) DRUG THERAPY: ICD-10-CM

## 2019-04-18 DIAGNOSIS — R51 HEADACHE: ICD-10-CM

## 2019-04-18 DIAGNOSIS — I10 ESSENTIAL (PRIMARY) HYPERTENSION: ICD-10-CM

## 2019-04-18 DIAGNOSIS — E78.5 HYPERLIPIDEMIA, UNSPECIFIED: ICD-10-CM

## 2019-04-18 DIAGNOSIS — Z79.02 LONG TERM (CURRENT) USE OF ANTITHROMBOTICS/ANTIPLATELETS: ICD-10-CM
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
## 2019-04-18 DIAGNOSIS — Z79.810 LONG TERM (CURRENT) USE OF SELECTIVE ESTROGEN RECEPTOR MODULATORS (SERMS): ICD-10-CM

## 2019-04-18 DIAGNOSIS — Z79.2 LONG TERM (CURRENT) USE OF ANTIBIOTICS: ICD-10-CM

## 2019-04-18 PROBLEM — N40.0 BENIGN PROSTATIC HYPERPLASIA WITHOUT LOWER URINARY TRACT SYMPTOMS: Chronic | Status: ACTIVE | Noted: 2018-10-30

## 2019-04-18 PROBLEM — I48.91 UNSPECIFIED ATRIAL FIBRILLATION: Chronic | Status: ACTIVE | Noted: 2018-10-30

## 2019-04-18 LAB
ALBUMIN SERPL ELPH-MCNC: 4.7 G/DL — SIGNIFICANT CHANGE UP (ref 3.5–5.2)
ALP SERPL-CCNC: 40 U/L — SIGNIFICANT CHANGE UP (ref 30–115)
ALT FLD-CCNC: 15 U/L — SIGNIFICANT CHANGE UP (ref 0–41)
ANION GAP SERPL CALC-SCNC: 13 MMOL/L — SIGNIFICANT CHANGE UP (ref 7–14)
APTT BLD: 34.9 SEC — SIGNIFICANT CHANGE UP (ref 27–39.2)
AST SERPL-CCNC: 18 U/L — SIGNIFICANT CHANGE UP (ref 0–41)
BASOPHILS # BLD AUTO: 0.02 K/UL — SIGNIFICANT CHANGE UP (ref 0–0.2)
BASOPHILS NFR BLD AUTO: 0.3 % — SIGNIFICANT CHANGE UP (ref 0–1)
BILIRUB SERPL-MCNC: 0.5 MG/DL — SIGNIFICANT CHANGE UP (ref 0.2–1.2)
BLD GP AB SCN SERPL QL: SIGNIFICANT CHANGE UP
BUN SERPL-MCNC: 24 MG/DL — HIGH (ref 10–20)
CALCIUM SERPL-MCNC: 10.1 MG/DL — SIGNIFICANT CHANGE UP (ref 8.5–10.1)
CHLORIDE SERPL-SCNC: 106 MMOL/L — SIGNIFICANT CHANGE UP (ref 98–110)
CO2 SERPL-SCNC: 23 MMOL/L — SIGNIFICANT CHANGE UP (ref 17–32)
CREAT SERPL-MCNC: 1.2 MG/DL — SIGNIFICANT CHANGE UP (ref 0.7–1.5)
EOSINOPHIL # BLD AUTO: 0.07 K/UL — SIGNIFICANT CHANGE UP (ref 0–0.7)
EOSINOPHIL NFR BLD AUTO: 1 % — SIGNIFICANT CHANGE UP (ref 0–8)
ETHANOL SERPL-MCNC: <10 MG/DL — SIGNIFICANT CHANGE UP
GLUCOSE SERPL-MCNC: 99 MG/DL — SIGNIFICANT CHANGE UP (ref 70–99)
HCT VFR BLD CALC: 42.2 % — SIGNIFICANT CHANGE UP (ref 42–52)
HGB BLD-MCNC: 14.1 G/DL — SIGNIFICANT CHANGE UP (ref 14–18)
IMM GRANULOCYTES NFR BLD AUTO: 0.4 % — HIGH (ref 0.1–0.3)
INR BLD: 1.17 RATIO — SIGNIFICANT CHANGE UP (ref 0.65–1.3)
LIDOCAIN IGE QN: 36 U/L — SIGNIFICANT CHANGE UP (ref 7–60)
LYMPHOCYTES # BLD AUTO: 0.87 K/UL — LOW (ref 1.2–3.4)
LYMPHOCYTES # BLD AUTO: 12.7 % — LOW (ref 20.5–51.1)
MCHC RBC-ENTMCNC: 29.7 PG — SIGNIFICANT CHANGE UP (ref 27–31)
MCHC RBC-ENTMCNC: 33.4 G/DL — SIGNIFICANT CHANGE UP (ref 32–37)
MCV RBC AUTO: 89 FL — SIGNIFICANT CHANGE UP (ref 80–94)
MONOCYTES # BLD AUTO: 0.55 K/UL — SIGNIFICANT CHANGE UP (ref 0.1–0.6)
MONOCYTES NFR BLD AUTO: 8 % — SIGNIFICANT CHANGE UP (ref 1.7–9.3)
NEUTROPHILS # BLD AUTO: 5.3 K/UL — SIGNIFICANT CHANGE UP (ref 1.4–6.5)
NEUTROPHILS NFR BLD AUTO: 77.6 % — HIGH (ref 42.2–75.2)
NRBC # BLD: 0 /100 WBCS — SIGNIFICANT CHANGE UP (ref 0–0)
PLATELET # BLD AUTO: 199 K/UL — SIGNIFICANT CHANGE UP (ref 130–400)
POTASSIUM SERPL-MCNC: 4.3 MMOL/L — SIGNIFICANT CHANGE UP (ref 3.5–5)
POTASSIUM SERPL-SCNC: 4.3 MMOL/L — SIGNIFICANT CHANGE UP (ref 3.5–5)
PROT SERPL-MCNC: 7.1 G/DL — SIGNIFICANT CHANGE UP (ref 6–8)
PROTHROM AB SERPL-ACNC: 13.4 SEC — HIGH (ref 9.95–12.87)
RBC # BLD: 4.74 M/UL — SIGNIFICANT CHANGE UP (ref 4.7–6.1)
RBC # FLD: 13 % — SIGNIFICANT CHANGE UP (ref 11.5–14.5)
SODIUM SERPL-SCNC: 142 MMOL/L — SIGNIFICANT CHANGE UP (ref 135–146)
TYPE + AB SCN PNL BLD: SIGNIFICANT CHANGE UP
WBC # BLD: 6.84 K/UL — SIGNIFICANT CHANGE UP (ref 4.8–10.8)
WBC # FLD AUTO: 6.84 K/UL — SIGNIFICANT CHANGE UP (ref 4.8–10.8)

## 2019-04-18 PROCEDURE — 72170 X-RAY EXAM OF PELVIS: CPT | Mod: 26

## 2019-04-18 PROCEDURE — 93010 ELECTROCARDIOGRAM REPORT: CPT

## 2019-04-18 PROCEDURE — 71260 CT THORAX DX C+: CPT | Mod: 26

## 2019-04-18 PROCEDURE — 71045 X-RAY EXAM CHEST 1 VIEW: CPT | Mod: 26

## 2019-04-18 PROCEDURE — 74177 CT ABD & PELVIS W/CONTRAST: CPT | Mod: 26

## 2019-04-18 PROCEDURE — 99284 EMERGENCY DEPT VISIT MOD MDM: CPT

## 2019-04-18 PROCEDURE — 70450 CT HEAD/BRAIN W/O DYE: CPT | Mod: 26

## 2019-04-18 PROCEDURE — 72125 CT NECK SPINE W/O DYE: CPT | Mod: 26

## 2019-04-18 RX ORDER — SODIUM CHLORIDE 9 MG/ML
1000 INJECTION INTRAMUSCULAR; INTRAVENOUS; SUBCUTANEOUS ONCE
Qty: 0 | Refills: 0 | Status: COMPLETED | OUTPATIENT
Start: 2019-04-18 | End: 2019-04-18

## 2019-04-18 RX ADMIN — SODIUM CHLORIDE 1000 MILLILITER(S): 9 INJECTION INTRAMUSCULAR; INTRAVENOUS; SUBCUTANEOUS at 16:00

## 2019-04-18 RX ADMIN — SODIUM CHLORIDE 1000 MILLILITER(S): 9 INJECTION INTRAMUSCULAR; INTRAVENOUS; SUBCUTANEOUS at 15:00

## 2019-04-18 NOTE — ED PROVIDER NOTE - CLINICAL SUMMARY MEDICAL DECISION MAKING FREE TEXT BOX
pt gcs 15, aware of labs and imaging, understands signs and symptoms to return for, cleared from surgery, will follow up with pmd and given rehab as well.
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
## 2019-04-18 NOTE — ED PROVIDER NOTE - NSFOLLOWUPCLINICS_GEN_ALL_ED_FT
[facility] [place] [address]  Phone: [phone]  Fax:   Follow Up Time: 1-3 Days    A Family Medicine Doctor  Family Medicine  .  [place]   Phone:   Fax:   Follow Up Time: 1-3 Days
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
## 2019-04-18 NOTE — ED PROVIDER NOTE - NS ED ROS FT
Constitutional:  See HPI.   Eyes:  No visual changes, eye pain or discharge.  ENMT:  No hearing changes, pain, discharge or infections. No neck pain or stiffness.  Cardiac:  No chest pain, SOB or edema. No chest pain with exertion.  Respiratory:  No cough or respiratory distress. No hemoptysis.  GI:  No nausea, vomiting, diarrhea, abdominal pain.  :  No dysuria, frequency, hematuria  MS:  No joint pain or back pain.  Neuro:  No LOC. No weakness.    Skin:  No skin rash.  Except as in HPI, all other review of systems is negative
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
## 2022-01-04 NOTE — DISCHARGE NOTE ADULT - BECAUSE OF A PHYSICAL, MENTAL OR EMOTIONAL CONDITION, DO YOU HAVE DIFFICULTY DOING  ERRANDS ALONE LIKE VISITING A DOCTOR'S OFFICE OR SHOPPING (15 YEARS AND OLDER)
Physical Therapy Daily Progress Note    Patient: Willard Lange   : 1962  Diagnosis/ICD-10 Code:  Spondylosis of lumbosacral region without myelopathy or radiculopathy [M47.817]  Referring practitioner: Em Parra*  Date of Initial Visit: Type: THERAPY  Noted: 2021  Today's Date: 2022  Patient seen for 19 sessions           Subjective Evaluation    History of Present Illness    Subjective comment: Pt reporting he is feeling fairly well today. Pain  Current pain rating: 3           Objective   See Exercise, Manual, and Modality Logs for complete treatment.       Assessment & Plan     Assessment    Assessment details: Pt tolerated today's session well. Pt would benefit from continued skilled therapy to address deficits. Progress per plan of care.                 Manual Therapy:    0     mins  59905;  Therapeutic Exercise:    0     mins  98772;     Neuromuscular Felisa:    0    mins  87675;    Therapeutic Activity:     0     mins  11871;     Gait Trainin     mins  14036;     Ultrasound:     0     mins  21455;    Electrical Stimulation:    0     mins  96894 ( );  Dry Needling     0     mins self-pay;  Aquatic Therapy    25     mins  20354;  Mechanical Traction    0     mins  41504  Moist Heat     0     mins  No charge    Timed Treatment:   25   mins   Total Treatment:     25   mins    Miguel Edwards PT  Physical Therapist    Electronically signed 2022    [place] License: PT - [id number]    No

## 2023-08-09 ENCOUNTER — OUTPATIENT (OUTPATIENT)
Dept: INPATIENT UNIT | Facility: HOSPITAL | Age: 83
LOS: 1 days | Discharge: HOME | End: 2023-08-09
Payer: MEDICARE

## 2023-08-09 VITALS
TEMPERATURE: 97 F | DIASTOLIC BLOOD PRESSURE: 79 MMHG | HEIGHT: 70 IN | RESPIRATION RATE: 18 BRPM | WEIGHT: 240.08 LBS | OXYGEN SATURATION: 97 % | SYSTOLIC BLOOD PRESSURE: 166 MMHG | HEART RATE: 78 BPM

## 2023-08-09 VITALS — DIASTOLIC BLOOD PRESSURE: 75 MMHG | HEART RATE: 97 BPM | SYSTOLIC BLOOD PRESSURE: 133 MMHG | RESPIRATION RATE: 17 BRPM

## 2023-08-09 DIAGNOSIS — H25.12 AGE-RELATED NUCLEAR CATARACT, LEFT EYE: ICD-10-CM

## 2023-08-09 PROCEDURE — V2632: CPT

## 2023-08-09 NOTE — ASU PATIENT PROFILE, ADULT - NSICDXPASTMEDICALHX_GEN_ALL_CORE_FT
PAST MEDICAL HISTORY:  Atrial fibrillation     BPH (benign prostatic hyperplasia)     CSD (cat scratch disease)     History of lymphoma     Hyperlipidemia     Hypertension

## 2023-08-09 NOTE — ASU DISCHARGE PLAN (ADULT/PEDIATRIC) - NS MD DC FALL RISK RISK
For information on Fall & Injury Prevention, visit: [email or web address] OR  [email or web address] OR  https://www.cdc.gov/steadi/patient.html

## 2023-08-14 DIAGNOSIS — H26.9 UNSPECIFIED CATARACT: ICD-10-CM

## 2023-08-14 DIAGNOSIS — Z85.72 PERSONAL HISTORY OF NON-HODGKIN LYMPHOMAS: ICD-10-CM

## 2023-08-14 DIAGNOSIS — N40.0 BENIGN PROSTATIC HYPERPLASIA WITHOUT LOWER URINARY TRACT SYMPTOMS: ICD-10-CM

## 2023-08-14 DIAGNOSIS — Z79.01 LONG TERM (CURRENT) USE OF ANTICOAGULANTS: ICD-10-CM

## 2023-08-14 DIAGNOSIS — E78.5 HYPERLIPIDEMIA, UNSPECIFIED: ICD-10-CM

## 2023-08-14 DIAGNOSIS — I10 ESSENTIAL (PRIMARY) HYPERTENSION: ICD-10-CM

## 2023-08-23 ENCOUNTER — OUTPATIENT (OUTPATIENT)
Dept: OUTPATIENT SERVICES | Facility: HOSPITAL | Age: 83
LOS: 1 days | Discharge: HOME | End: 2023-08-23
Payer: MEDICARE

## 2023-08-23 VITALS — SYSTOLIC BLOOD PRESSURE: 133 MMHG | RESPIRATION RATE: 18 BRPM | HEART RATE: 83 BPM | DIASTOLIC BLOOD PRESSURE: 73 MMHG

## 2023-08-23 VITALS
OXYGEN SATURATION: 99 % | HEART RATE: 75 BPM | RESPIRATION RATE: 17 BRPM | DIASTOLIC BLOOD PRESSURE: 74 MMHG | SYSTOLIC BLOOD PRESSURE: 162 MMHG | WEIGHT: 240.08 LBS | HEIGHT: 71 IN | TEMPERATURE: 96 F

## 2023-08-23 DIAGNOSIS — H26.9 UNSPECIFIED CATARACT: ICD-10-CM

## 2023-08-23 DIAGNOSIS — H25.11 AGE-RELATED NUCLEAR CATARACT, RIGHT EYE: ICD-10-CM

## 2023-08-23 DIAGNOSIS — Z98.49 CATARACT EXTRACTION STATUS, UNSPECIFIED EYE: Chronic | ICD-10-CM

## 2023-08-23 PROCEDURE — V2632: CPT

## 2023-08-23 RX ORDER — APIXABAN 2.5 MG/1
1 TABLET, FILM COATED ORAL
Qty: 0 | Refills: 0 | DISCHARGE

## 2023-08-23 RX ORDER — METOPROLOL TARTRATE 50 MG
1 TABLET ORAL
Qty: 0 | Refills: 0 | DISCHARGE

## 2023-08-23 RX ORDER — ATORVASTATIN CALCIUM 80 MG/1
1 TABLET, FILM COATED ORAL
Refills: 0 | DISCHARGE

## 2023-08-23 RX ORDER — FENOFIBRIC ACID 105 MG/1
1 TABLET ORAL
Qty: 0 | Refills: 0 | DISCHARGE

## 2023-08-23 RX ORDER — TAMSULOSIN HYDROCHLORIDE 0.4 MG/1
1 CAPSULE ORAL
Qty: 0 | Refills: 0 | DISCHARGE

## 2023-08-23 RX ORDER — ATORVASTATIN CALCIUM 80 MG/1
1 TABLET, FILM COATED ORAL
Qty: 0 | Refills: 0 | DISCHARGE

## 2023-08-23 RX ORDER — APIXABAN 2.5 MG/1
1 TABLET, FILM COATED ORAL
Refills: 0 | DISCHARGE

## 2023-08-23 NOTE — ASU PATIENT PROFILE, ADULT - AS SC BRADEN FRICTION
(3) no apparent problem Alternatives Discussed Intro (Do Not Add Period): I discussed alternative treatments to Mohs surgery and specifically discussed the risks and benefits of

## 2023-08-23 NOTE — ASU PATIENT PROFILE, ADULT - NSICDXPASTMEDICALHX_GEN_ALL_CORE_FT
PAST MEDICAL HISTORY:  Atrial fibrillation     Bilateral cataracts     BPH (benign prostatic hyperplasia)     CSD (cat scratch disease)     Hyperlipidemia     Hypertension     Prostate cancer

## 2023-08-23 NOTE — ASU PATIENT PROFILE, ADULT - FALL HARM RISK - UNIVERSAL INTERVENTIONS
Bed in lowest position, wheels locked, appropriate side rails in place/Call bell, personal items and telephone in reach/Instruct patient to call for assistance before getting out of bed or chair/Non-slip footwear when patient is out of bed/[place] to call system/Physically safe environment - no spills, clutter or unnecessary equipment/Purposeful Proactive Rounding/Room/bathroom lighting operational, light cord in reach

## 2023-08-23 NOTE — CHART NOTE - NSCHARTNOTEFT_GEN_A_CORE
PACU ANESTHESIA ADMISSION NOTE      Procedure: Left eye cataract extraction  Post op diagnosis:  Left eye cataract    __x__  Patent Airway    __x__  Full return of protective reflexes    __x__  Full recovery from anesthesia / back to baseline status    Vitals:  T(C): 35.6 (08-23-23 @ 09:02), Max: 35.6 (08-23-23 @ 07:27)  HR: 75 (08-23-23 @ 09:02) (75 - 75)  BP: 162/74 (08-23-23 @ 09:02) (162/74 - 162/74)  RR: 17 (08-23-23 @ 09:02) (17 - 17)  SpO2: 99% (08-23-23 @ 09:02) (99% - 99%)    Mental Status:  __x__ Awake   ___x__ Alert   _____ Drowsy   _____ Sedated    Nausea/Vomiting:  __x__ NO  ______Yes,   See Post - Op Orders          Pain Scale (0-10):  __0___    Treatment: ____ None    __x__ See Post - Op/PCA Orders    Post - Operative Fluids:   ____ Oral   __x__ See Post - Op Orders    Plan: Discharge:   __x__Home       _____Floor     _____Critical Care    _____  Other:_________________    Comments: Patient had smooth intraoperative event, no anesthesia complication.  PACU Vital signs: HR: 83           BP:        133/73          RR: 15            O2 Sat:       96% PACU ANESTHESIA ADMISSION NOTE      Procedure: Right eye cataract extraction  Post op diagnosis:  Right eye cataract    __x__  Patent Airway    __x__  Full return of protective reflexes    __x__  Full recovery from anesthesia / back to baseline status    Vitals:  T(C): 35.6 (08-23-23 @ 09:02), Max: 35.6 (08-23-23 @ 07:27)  HR: 75 (08-23-23 @ 09:02) (75 - 75)  BP: 162/74 (08-23-23 @ 09:02) (162/74 - 162/74)  RR: 17 (08-23-23 @ 09:02) (17 - 17)  SpO2: 99% (08-23-23 @ 09:02) (99% - 99%)    Mental Status:  __x__ Awake   ___x__ Alert   _____ Drowsy   _____ Sedated    Nausea/Vomiting:  __x__ NO  ______Yes,   See Post - Op Orders          Pain Scale (0-10):  __0___    Treatment: ____ None    __x__ See Post - Op/PCA Orders    Post - Operative Fluids:   ____ Oral   __x__ See Post - Op Orders    Plan: Discharge:   __x__Home       _____Floor     _____Critical Care    _____  Other:_________________    Comments: Patient had smooth intraoperative event, no anesthesia complication.  PACU Vital signs: HR: 83           BP:        133/73          RR: 15            O2 Sat:       96%

## 2023-08-23 NOTE — PRE-ANESTHESIA EVALUATION ADULT - TEMPERATURE IN CELSIUS (DEGREES C)
35.6 Op[place]ura Pregnancy And Lactation Text: There is insufficient data to evaluate drug-associated risk for major birth defects, miscarriage, or other adverse maternal or fetal outcomes.  There is a pregnancy registry that monitors pregnancy outcomes in pregnant persons exposed to the medication during pregnancy.  It is unknown if this medication is excreted in breast milk.  Do not breastfeed during treatment and for about 4 weeks after the last dose.

## 2023-08-25 DIAGNOSIS — Z88.1 ALLERGY STATUS TO OTHER ANTIBIOTIC AGENTS STATUS: ICD-10-CM

## 2023-08-25 DIAGNOSIS — N40.0 BENIGN PROSTATIC HYPERPLASIA WITHOUT LOWER URINARY TRACT SYMPTOMS: ICD-10-CM

## 2023-08-25 DIAGNOSIS — Z85.46 PERSONAL HISTORY OF MALIGNANT NEOPLASM OF PROSTATE: ICD-10-CM

## 2023-08-25 DIAGNOSIS — H25.89 OTHER AGE-RELATED CATARACT: ICD-10-CM

## 2023-08-25 DIAGNOSIS — A28.1 CAT-SCRATCH DISEASE: ICD-10-CM

## 2023-08-25 DIAGNOSIS — I10 ESSENTIAL (PRIMARY) HYPERTENSION: ICD-10-CM

## 2023-08-25 DIAGNOSIS — Z79.01 LONG TERM (CURRENT) USE OF ANTICOAGULANTS: ICD-10-CM

## 2023-08-25 DIAGNOSIS — Z98.49 CATARACT EXTRACTION STATUS, UNSPECIFIED EYE: ICD-10-CM

## 2023-08-25 DIAGNOSIS — E78.5 HYPERLIPIDEMIA, UNSPECIFIED: ICD-10-CM

## 2024-05-12 ENCOUNTER — INPATIENT (INPATIENT)
Facility: HOSPITAL | Age: 84
LOS: 2 days | Discharge: HOME | DRG: 871 | End: 2024-05-15
Attending: STUDENT IN AN ORGANIZED HEALTH CARE EDUCATION/TRAINING PROGRAM | Admitting: INTERNAL MEDICINE
Payer: MEDICARE

## 2024-05-12 VITALS
HEART RATE: 94 BPM | OXYGEN SATURATION: 95 % | RESPIRATION RATE: 18 BRPM | WEIGHT: 259.93 LBS | SYSTOLIC BLOOD PRESSURE: 118 MMHG | DIASTOLIC BLOOD PRESSURE: 56 MMHG | TEMPERATURE: 98 F

## 2024-05-12 DIAGNOSIS — Z98.49 CATARACT EXTRACTION STATUS, UNSPECIFIED EYE: Chronic | ICD-10-CM

## 2024-05-12 DIAGNOSIS — J18.9 PNEUMONIA, UNSPECIFIED ORGANISM: ICD-10-CM

## 2024-05-12 PROBLEM — A28.1 CAT-SCRATCH DISEASE: Chronic | Status: ACTIVE | Noted: 2023-08-09

## 2024-05-12 PROBLEM — C61 MALIGNANT NEOPLASM OF PROSTATE: Chronic | Status: ACTIVE | Noted: 2023-08-23

## 2024-05-12 PROBLEM — H26.9 UNSPECIFIED CATARACT: Chronic | Status: ACTIVE | Noted: 2023-08-23

## 2024-05-12 LAB
ALBUMIN SERPL ELPH-MCNC: 3.6 G/DL — SIGNIFICANT CHANGE UP (ref 3.5–5.2)
ALP SERPL-CCNC: 62 U/L — SIGNIFICANT CHANGE UP (ref 30–115)
ALT FLD-CCNC: 29 U/L — SIGNIFICANT CHANGE UP (ref 0–41)
ANION GAP SERPL CALC-SCNC: 14 MMOL/L — SIGNIFICANT CHANGE UP (ref 7–14)
APPEARANCE UR: ABNORMAL
AST SERPL-CCNC: 53 U/L — HIGH (ref 0–41)
BACTERIA # UR AUTO: NEGATIVE /HPF — SIGNIFICANT CHANGE UP
BASE EXCESS BLDV CALC-SCNC: -0.5 MMOL/L — SIGNIFICANT CHANGE UP (ref -2–3)
BASOPHILS # BLD AUTO: 0.01 K/UL — SIGNIFICANT CHANGE UP (ref 0–0.2)
BASOPHILS NFR BLD AUTO: 0.1 % — SIGNIFICANT CHANGE UP (ref 0–1)
BILIRUB SERPL-MCNC: 1.4 MG/DL — HIGH (ref 0.2–1.2)
BILIRUB UR-MCNC: ABNORMAL
BUN SERPL-MCNC: 18 MG/DL — SIGNIFICANT CHANGE UP (ref 10–20)
CA-I SERPL-SCNC: 1.19 MMOL/L — SIGNIFICANT CHANGE UP (ref 1.15–1.33)
CALCIUM SERPL-MCNC: 9.2 MG/DL — SIGNIFICANT CHANGE UP (ref 8.4–10.4)
CAST: 17 /LPF — HIGH (ref 0–4)
CHLORIDE SERPL-SCNC: 97 MMOL/L — LOW (ref 98–110)
CO2 SERPL-SCNC: 21 MMOL/L — SIGNIFICANT CHANGE UP (ref 17–32)
COLOR SPEC: SIGNIFICANT CHANGE UP
CREAT SERPL-MCNC: 1.5 MG/DL — SIGNIFICANT CHANGE UP (ref 0.7–1.5)
DIFF PNL FLD: ABNORMAL
EGFR: 45 ML/MIN/1.73M2 — LOW
EOSINOPHIL # BLD AUTO: 0 K/UL — SIGNIFICANT CHANGE UP (ref 0–0.7)
EOSINOPHIL NFR BLD AUTO: 0 % — SIGNIFICANT CHANGE UP (ref 0–8)
FLUAV AG NPH QL: SIGNIFICANT CHANGE UP
FLUBV AG NPH QL: SIGNIFICANT CHANGE UP
GAS PNL BLDV: 123 MMOL/L — LOW (ref 136–145)
GAS PNL BLDV: SIGNIFICANT CHANGE UP
GLUCOSE SERPL-MCNC: 125 MG/DL — HIGH (ref 70–99)
GLUCOSE UR QL: NEGATIVE MG/DL — SIGNIFICANT CHANGE UP
HCO3 BLDV-SCNC: 23 MMOL/L — SIGNIFICANT CHANGE UP (ref 22–29)
HCT VFR BLD CALC: 33.9 % — LOW (ref 42–52)
HCT VFR BLDA CALC: 37 % — LOW (ref 39–51)
HGB BLD CALC-MCNC: 12.4 G/DL — LOW (ref 12.6–17.4)
HGB BLD-MCNC: 11.2 G/DL — LOW (ref 14–18)
IMM GRANULOCYTES NFR BLD AUTO: 0.6 % — HIGH (ref 0.1–0.3)
KETONES UR-MCNC: ABNORMAL MG/DL
LACTATE BLDV-MCNC: 2.7 MMOL/L — HIGH (ref 0.5–2)
LACTATE SERPL-SCNC: 1.7 MMOL/L — SIGNIFICANT CHANGE UP (ref 0.7–2)
LEUKOCYTE ESTERASE UR-ACNC: NEGATIVE — SIGNIFICANT CHANGE UP
LYMPHOCYTES # BLD AUTO: 0.4 K/UL — LOW (ref 1.2–3.4)
LYMPHOCYTES # BLD AUTO: 3.8 % — LOW (ref 20.5–51.1)
MCHC RBC-ENTMCNC: 30.1 PG — SIGNIFICANT CHANGE UP (ref 27–31)
MCHC RBC-ENTMCNC: 33 G/DL — SIGNIFICANT CHANGE UP (ref 32–37)
MCV RBC AUTO: 91.1 FL — SIGNIFICANT CHANGE UP (ref 80–94)
MONOCYTES # BLD AUTO: 0.59 K/UL — SIGNIFICANT CHANGE UP (ref 0.1–0.6)
MONOCYTES NFR BLD AUTO: 5.6 % — SIGNIFICANT CHANGE UP (ref 1.7–9.3)
NEUTROPHILS # BLD AUTO: 9.48 K/UL — HIGH (ref 1.4–6.5)
NEUTROPHILS NFR BLD AUTO: 89.9 % — HIGH (ref 42.2–75.2)
NITRITE UR-MCNC: NEGATIVE — SIGNIFICANT CHANGE UP
NRBC # BLD: 0 /100 WBCS — SIGNIFICANT CHANGE UP (ref 0–0)
NT-PROBNP SERPL-SCNC: 3779 PG/ML — HIGH (ref 0–300)
PCO2 BLDV: 33 MMHG — LOW (ref 42–55)
PH BLDV: 7.45 — HIGH (ref 7.32–7.43)
PH UR: 5.5 — SIGNIFICANT CHANGE UP (ref 5–8)
PLATELET # BLD AUTO: 167 K/UL — SIGNIFICANT CHANGE UP (ref 130–400)
PMV BLD: 9.2 FL — SIGNIFICANT CHANGE UP (ref 7.4–10.4)
PO2 BLDV: 48 MMHG — HIGH (ref 25–45)
POTASSIUM BLDV-SCNC: 8.2 MMOL/L — CRITICAL HIGH (ref 3.5–5.1)
POTASSIUM SERPL-MCNC: 4.2 MMOL/L — SIGNIFICANT CHANGE UP (ref 3.5–5)
POTASSIUM SERPL-SCNC: 4.2 MMOL/L — SIGNIFICANT CHANGE UP (ref 3.5–5)
PROT SERPL-MCNC: 6.4 G/DL — SIGNIFICANT CHANGE UP (ref 6–8)
PROT UR-MCNC: 100 MG/DL
RBC # BLD: 3.72 M/UL — LOW (ref 4.7–6.1)
RBC # FLD: 13.7 % — SIGNIFICANT CHANGE UP (ref 11.5–14.5)
RBC CASTS # UR COMP ASSIST: 1 /HPF — SIGNIFICANT CHANGE UP (ref 0–4)
RSV RNA NPH QL NAA+NON-PROBE: SIGNIFICANT CHANGE UP
SAO2 % BLDV: 85.4 % — SIGNIFICANT CHANGE UP (ref 67–88)
SARS-COV-2 RNA SPEC QL NAA+PROBE: SIGNIFICANT CHANGE UP
SODIUM SERPL-SCNC: 132 MMOL/L — LOW (ref 135–146)
SP GR SPEC: 1.03 — SIGNIFICANT CHANGE UP (ref 1–1.03)
SQUAMOUS # UR AUTO: 3 /HPF — SIGNIFICANT CHANGE UP (ref 0–5)
TROPONIN T, HIGH SENSITIVITY RESULT: 18 NG/L — SIGNIFICANT CHANGE UP (ref 6–21)
TROPONIN T, HIGH SENSITIVITY RESULT: 21 NG/L — SIGNIFICANT CHANGE UP (ref 6–21)
UROBILINOGEN FLD QL: 1 MG/DL — SIGNIFICANT CHANGE UP (ref 0.2–1)
WBC # BLD: 10.54 K/UL — SIGNIFICANT CHANGE UP (ref 4.8–10.8)
WBC # FLD AUTO: 10.54 K/UL — SIGNIFICANT CHANGE UP (ref 4.8–10.8)
WBC UR QL: 3 /HPF — SIGNIFICANT CHANGE UP (ref 0–5)

## 2024-05-12 PROCEDURE — 87640 STAPH A DNA AMP PROBE: CPT

## 2024-05-12 PROCEDURE — 36000 PLACE NEEDLE IN VEIN: CPT

## 2024-05-12 PROCEDURE — 82746 ASSAY OF FOLIC ACID SERUM: CPT

## 2024-05-12 PROCEDURE — 80053 COMPREHEN METABOLIC PANEL: CPT

## 2024-05-12 PROCEDURE — 36415 COLL VENOUS BLD VENIPUNCTURE: CPT

## 2024-05-12 PROCEDURE — 85025 COMPLETE CBC W/AUTO DIFF WBC: CPT

## 2024-05-12 PROCEDURE — 82728 ASSAY OF FERRITIN: CPT

## 2024-05-12 PROCEDURE — 99223 1ST HOSP IP/OBS HIGH 75: CPT

## 2024-05-12 PROCEDURE — 84145 PROCALCITONIN (PCT): CPT

## 2024-05-12 PROCEDURE — 71045 X-RAY EXAM CHEST 1 VIEW: CPT | Mod: 26

## 2024-05-12 PROCEDURE — 71250 CT THORAX DX C-: CPT | Mod: 26,MC

## 2024-05-12 PROCEDURE — 80048 BASIC METABOLIC PNL TOTAL CA: CPT

## 2024-05-12 PROCEDURE — 82607 VITAMIN B-12: CPT

## 2024-05-12 PROCEDURE — 83605 ASSAY OF LACTIC ACID: CPT

## 2024-05-12 PROCEDURE — 99285 EMERGENCY DEPT VISIT HI MDM: CPT

## 2024-05-12 PROCEDURE — 87641 MR-STAPH DNA AMP PROBE: CPT

## 2024-05-12 PROCEDURE — 84466 ASSAY OF TRANSFERRIN: CPT

## 2024-05-12 PROCEDURE — 85027 COMPLETE CBC AUTOMATED: CPT

## 2024-05-12 PROCEDURE — 85379 FIBRIN DEGRADATION QUANT: CPT

## 2024-05-12 PROCEDURE — 76937 US GUIDE VASCULAR ACCESS: CPT | Mod: 26

## 2024-05-12 PROCEDURE — 83550 IRON BINDING TEST: CPT

## 2024-05-12 PROCEDURE — 93970 EXTREMITY STUDY: CPT

## 2024-05-12 PROCEDURE — 83540 ASSAY OF IRON: CPT

## 2024-05-12 PROCEDURE — 97162 PT EVAL MOD COMPLEX 30 MIN: CPT | Mod: GP

## 2024-05-12 RX ORDER — CEFEPIME 1 G/1
2000 INJECTION, POWDER, FOR SOLUTION INTRAMUSCULAR; INTRAVENOUS ONCE
Refills: 0 | Status: COMPLETED | OUTPATIENT
Start: 2024-05-12 | End: 2024-05-12

## 2024-05-12 RX ORDER — CEFTRIAXONE 500 MG/1
1000 INJECTION, POWDER, FOR SOLUTION INTRAMUSCULAR; INTRAVENOUS EVERY 24 HOURS
Refills: 0 | Status: DISCONTINUED | OUTPATIENT
Start: 2024-05-13 | End: 2024-05-15

## 2024-05-12 RX ORDER — CEFTRIAXONE 500 MG/1
INJECTION, POWDER, FOR SOLUTION INTRAMUSCULAR; INTRAVENOUS
Refills: 0 | Status: DISCONTINUED | OUTPATIENT
Start: 2024-05-12 | End: 2024-05-15

## 2024-05-12 RX ORDER — IPRATROPIUM/ALBUTEROL SULFATE 18-103MCG
3 AEROSOL WITH ADAPTER (GRAM) INHALATION EVERY 6 HOURS
Refills: 0 | Status: DISCONTINUED | OUTPATIENT
Start: 2024-05-12 | End: 2024-05-15

## 2024-05-12 RX ORDER — CEFTRIAXONE 500 MG/1
1000 INJECTION, POWDER, FOR SOLUTION INTRAMUSCULAR; INTRAVENOUS ONCE
Refills: 0 | Status: COMPLETED | OUTPATIENT
Start: 2024-05-12 | End: 2024-05-12

## 2024-05-12 RX ORDER — METOPROLOL TARTRATE 50 MG
25 TABLET ORAL
Refills: 0 | Status: DISCONTINUED | OUTPATIENT
Start: 2024-05-12 | End: 2024-05-15

## 2024-05-12 RX ORDER — AZITHROMYCIN 500 MG/1
500 TABLET, FILM COATED ORAL ONCE
Refills: 0 | Status: COMPLETED | OUTPATIENT
Start: 2024-05-12 | End: 2024-05-12

## 2024-05-12 RX ORDER — SODIUM CHLORIDE 9 MG/ML
1000 INJECTION INTRAMUSCULAR; INTRAVENOUS; SUBCUTANEOUS ONCE
Refills: 0 | Status: COMPLETED | OUTPATIENT
Start: 2024-05-12 | End: 2024-05-12

## 2024-05-12 RX ORDER — APIXABAN 2.5 MG/1
2.5 TABLET, FILM COATED ORAL EVERY 12 HOURS
Refills: 0 | Status: DISCONTINUED | OUTPATIENT
Start: 2024-05-12 | End: 2024-05-15

## 2024-05-12 RX ORDER — ACETAMINOPHEN 500 MG
650 TABLET ORAL EVERY 6 HOURS
Refills: 0 | Status: DISCONTINUED | OUTPATIENT
Start: 2024-05-12 | End: 2024-05-15

## 2024-05-12 RX ORDER — AMLODIPINE BESYLATE 2.5 MG/1
5 TABLET ORAL DAILY
Refills: 0 | Status: DISCONTINUED | OUTPATIENT
Start: 2024-05-12 | End: 2024-05-14

## 2024-05-12 RX ORDER — AZITHROMYCIN 500 MG/1
TABLET, FILM COATED ORAL
Refills: 0 | Status: DISCONTINUED | OUTPATIENT
Start: 2024-05-12 | End: 2024-05-12

## 2024-05-12 RX ORDER — ATORVASTATIN CALCIUM 80 MG/1
20 TABLET, FILM COATED ORAL AT BEDTIME
Refills: 0 | Status: DISCONTINUED | OUTPATIENT
Start: 2024-05-12 | End: 2024-05-15

## 2024-05-12 RX ORDER — VANCOMYCIN HCL 1 G
1000 VIAL (EA) INTRAVENOUS ONCE
Refills: 0 | Status: COMPLETED | OUTPATIENT
Start: 2024-05-12 | End: 2024-05-12

## 2024-05-12 RX ORDER — TAMSULOSIN HYDROCHLORIDE 0.4 MG/1
0.4 CAPSULE ORAL AT BEDTIME
Refills: 0 | Status: DISCONTINUED | OUTPATIENT
Start: 2024-05-12 | End: 2024-05-15

## 2024-05-12 RX ORDER — ACETAMINOPHEN 500 MG
975 TABLET ORAL ONCE
Refills: 0 | Status: COMPLETED | OUTPATIENT
Start: 2024-05-12 | End: 2024-05-12

## 2024-05-12 RX ORDER — FENOFIBRATE,MICRONIZED 130 MG
145 CAPSULE ORAL DAILY
Refills: 0 | Status: DISCONTINUED | OUTPATIENT
Start: 2024-05-12 | End: 2024-05-15

## 2024-05-12 RX ADMIN — CEFEPIME 100 MILLIGRAM(S): 1 INJECTION, POWDER, FOR SOLUTION INTRAMUSCULAR; INTRAVENOUS at 16:26

## 2024-05-12 RX ADMIN — SODIUM CHLORIDE 1000 MILLILITER(S): 9 INJECTION INTRAMUSCULAR; INTRAVENOUS; SUBCUTANEOUS at 15:25

## 2024-05-12 RX ADMIN — CEFTRIAXONE 1000 MILLIGRAM(S): 500 INJECTION, POWDER, FOR SOLUTION INTRAMUSCULAR; INTRAVENOUS at 22:07

## 2024-05-12 RX ADMIN — Medication 975 MILLIGRAM(S): at 16:26

## 2024-05-12 RX ADMIN — SODIUM CHLORIDE 1000 MILLILITER(S): 9 INJECTION INTRAMUSCULAR; INTRAVENOUS; SUBCUTANEOUS at 17:04

## 2024-05-12 RX ADMIN — TAMSULOSIN HYDROCHLORIDE 0.4 MILLIGRAM(S): 0.4 CAPSULE ORAL at 22:07

## 2024-05-12 RX ADMIN — Medication 250 MILLIGRAM(S): at 17:04

## 2024-05-12 RX ADMIN — ATORVASTATIN CALCIUM 20 MILLIGRAM(S): 80 TABLET, FILM COATED ORAL at 22:07

## 2024-05-12 RX ADMIN — AZITHROMYCIN 500 MILLIGRAM(S): 500 TABLET, FILM COATED ORAL at 22:08
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
## 2024-05-12 NOTE — H&P ADULT - ASSESSMENT
Mr. Anderson is an 83-year-old male with a past medical history of prostate cancer s/p seed procedure, atrial fibrillation (on Eliquis), hypertension who presented to the emergency department for fever lasting 3 to 4 days, accompanied by weakness and wheezing. Pt admitted to medicine for Sepsis 2/2 LLL Pneumonia.    #Sepsis 2/2 Left Lower Lobe Pneumonia Mr. Andesron is an 83-year-old male with a past medical history of prostate cancer s/p seed procedure, atrial fibrillation (on Eliquis), hypertension who presented to the emergency department for fever lasting 3 to 4 days, accompanied by weakness and wheezing. Pt admitted to medicine for Sepsis 2/2 LLL Pneumonia.    #Sepsis 2/2 Left Lower Lobe Community Acquired Pneumonia  - WBC 10k w/Neutrophilic predominance, TMax 102.1, , RR 19  - Pulse ox q 8 hrs  - CT Chest and CXR reviewed   - f/u Procal, RVP, Legionella/strep urine antigen, Blood and Sputum c/x  - Nebs prn sob/wheezing  - Incentive spirometry  - NC O2 prn sob, hypoxia with goal >92%  - IV antibiotics - rocephin/azithro  - f/u repeat lactate (VBG lactate 2.7, Serum Lactate 1.7)    #CKD Stage 3  - Cr 1.5 on admission (Baseline 1.2 - 1.4 in 2022)  - avoid nephrotoxic agents  - Renally dose meds  - f/u repeat BMP for K; VBG K was 8.2; serum K was 4.2 (Hemolyzed). It is a possibility that VBG sample was hemolyzed as well.    #Macrocytic Anemia  - Hgb 11.2 (baseline 14.8 in 2022)  - does not remember last colonoscopy and denies any melena or hematochezia  - anemia possibly 2/2 CKD  - f/u iron studies; avoid IV iron in setting of sepsis    #Paroxsymal Atrial Fibrillation  #Hypertension  - c/w Eliquis 2.5 BID for now; Age 83 and Creat 1.5; He takes 5mg BID at home  - c/w Lopressor 25mg PO BID and Amlodipine 5mg    #Hyperlipidemia  - c/w Atorvastatin 20mg QD and Fenofibrate 145mg PO QD    #Prostate Cancer s/p Seeding Therapy  #BPH  - c/w Tamsulosin  - out pt f/u     #Misc  - DVT Prophylaxis: Eliquis  - GI Prophylaxis: None  - Diet: DASH/CC  - Activity: IAT  - IV Fluids: None  - Code Status: Full     Mr. Anderson is an 83-year-old male with a past medical history of prostate cancer s/p seed procedure, atrial fibrillation (on Eliquis), hypertension who presented to the emergency department for fever lasting 3 to 4 days, accompanied by weakness and wheezing. Pt admitted to medicine for Sepsis 2/2 LLL Pneumonia.    #Sepsis 2/2 Left Lower Lobe Community Acquired Pneumonia  - WBC 10k w/Neutrophilic predominance, TMax 102.1, , RR 19  - Pulse ox q 8 hrs  - CT Chest and CXR reviewed   - f/u Procal, RVP, Legionella/strep urine antigen, Blood and Sputum c/x  - Nebs prn sob/wheezing  - Incentive spirometry  - NC O2 prn sob, hypoxia with goal >92%  - IV antibiotics - rocephin/azithro  - f/u repeat lactate (VBG lactate 2.7, Serum Lactate 1.7)  - BNP 3779; No hx of CHF. Can be elevated in the setting of PNA and Sepsis; consider ECHO; No TTE on file    #CKD Stage 3  - Cr 1.5 on admission (Baseline 1.2 - 1.4 in 2022)  - avoid nephrotoxic agents  - Renally dose meds  - f/u repeat BMP for K; VBG K was 8.2; serum K was 4.2 (Hemolyzed). It is a possibility that VBG sample was hemolyzed as well.    #Macrocytic Anemia  - Hgb 11.2 (baseline 14.8 in 2022)  - does not remember last colonoscopy and denies any melena or hematochezia  - anemia possibly 2/2 CKD  - f/u iron studies; avoid IV iron in setting of sepsis    #Paroxsymal Atrial Fibrillation  #Hypertension  - c/w Eliquis 2.5 BID for now; Age 83 and Creat 1.5; He takes 5mg BID at home  - c/w Lopressor 25mg PO BID and Amlodipine 5mg    #Hyperlipidemia  - c/w Atorvastatin 20mg QD and Fenofibrate 145mg PO QD    #Prostate Cancer s/p Seeding Therapy  #BPH  - c/w Tamsulosin  - out pt f/u     #Misc  - DVT Prophylaxis: Eliquis  - GI Prophylaxis: None  - Diet: DASH/CC  - Activity: IAT  - IV Fluids: None  - Code Status: Full

## 2024-05-12 NOTE — ED PROVIDER NOTE - CLINICAL SUMMARY MEDICAL DECISION MAKING FREE TEXT BOX
83 male history as above presents for evaluation of 3 to 4 days of cough fever.  Here initially patient afebrile on pulse ox in the mid 90s.  Exam with left lower crackles.  While in ED patient became febrile tachycardic.  X-rays no acute findings and CT chest noncontrast demonstrates a left lower lobe pneumonia.  Patient given antibiotics additional fluid bolus admitted for pneumonia sepsis.  Additionally time admission patient pulse ox is 9091.  No respiratory distress will place on nasal cannula.

## 2024-05-12 NOTE — ED PROVIDER NOTE - IV ALTEPLASE ADMIN OUTSIDE HIDDEN
show O-T Plasty Text: The defect edges were debeveled with a #15 scalpel blade.  Given the location of the defect, shape of the defect and the proximity to free margins an O-T plasty was deemed most appropriate.  Using a sterile surgical marker, an appropriate O-T plasty was drawn incorporating the defect and placing the expected incisions within the relaxed skin tension lines where possible.    The area thus outlined was incised deep to adipose tissue with a #15 scalpel blade.  The skin margins were undermined to an appropriate distance in all directions utilizing iris scissors.

## 2024-05-12 NOTE — H&P ADULT - ATTENDING COMMENTS
Patient seen and examined at bedside independently of the residents. I read the resident's note and agree with the plan with the additions and corrections as noted below. My note supersedes the resident's note.     REVIEW OF SYSTEMS:  Negative except in HPI.     PMH: Prostate cancer s/p seed procedure, Paroxysmal A.fib (eliquis), HTN    FHx: Reviewed. No fhx of asthma/copd, No fhx of liver and pulmonary disease. No fhx of hematological disorder.     Physical Exam:  GEN: No acute distress. Awake, Alert and oriented x 3.   Head: Atraumatic, Normocephalic.   Eye: PEERLA. No sclera icterus. EOMI.   ENT: Normal oropharynx, no thyromegaly, no mass, no lymphadenopathy.   LUNGS: Clear to auscultation bilaterally. No wheeze/rales/crackles.   HEART: Normal. S1/S2 present. RRR. No murmur/gallops.   ABD: Soft, non-tender, non-distended. Bowel sounds present.   EXT: No pitting edema. No erythema. No tenderness.  Integumentary: No rash, No sore, No petechia.   NEURO: CN III-XII intact. Strength: 5/5 b/l ULE. Sensory intact b/l ULE.     Vital Signs Last 24 Hrs  T(C): 37.4 (12 May 2024 18:37), Max: 38.9 (12 May 2024 15:33)  T(F): 99.3 (12 May 2024 18:37), Max: 102.1 (12 May 2024 15:33)  HR: 70 (12 May 2024 18:37) (70 - 114)  BP: 148/66 (12 May 2024 18:37) (118/56 - 190/74)  BP(mean): --  RR: 19 (12 May 2024 18:37) (16 - 19)  SpO2: 94% (12 May 2024 18:37) (94% - 96%)    Parameters below as of 12 May 2024 18:37  Patient On (Oxygen Delivery Method): nasal cannula  O2 Flow (L/min): 2L      Please see the above notes for Labs and radiology.     Assessment and Plan:     82 yo M with hx of Prostate cancer s/p seed procedure, Paroxysmal A.fib (eliquis), HTN presents to ED for 3-4 days hx of fever with generalized weakness and wheezing and AVENDANO.     Sepsis and Acute hypoxic respiratory failure 2/2 Left sided PNA   - CT chest:  Left lower lobe pneumonia.  - s/p 2L NS bolus, Vanc and Cefepime given in ED.   - c/w  Doxy and ceftriaxone (no recent abx use or hospitalization)  - f/u BCx, Atypical PNA panel, MRSA nares.   - supplemental O2 prn  - supportive care.     Prostate cancer s/p seed procedure - f/u outpatient.   Paroxysmal A.fib - c/w home med.   HTN - c/w home med.     DVT ppx: Eliquis  GI ppx: not indicated.   Diet: DASH diet  Activity: as tolerated.     Date seen by the attendin2024  Total time spent: 75 minutes. Patient seen and examined at bedside independently of the residents. I read the resident's note and agree with the plan with the additions and corrections as noted below. My note supersedes the resident's note.     REVIEW OF SYSTEMS:  Negative except in HPI.     PMH: Prostate cancer s/p seed procedure, Paroxysmal A.fib (eliquis), HTN    FHx: Reviewed. No fhx of asthma/copd, No fhx of liver and pulmonary disease. No fhx of hematological disorder.     Physical Exam:  GEN: No acute distress. Awake, Alert and oriented x 3.   Head: Atraumatic, Normocephalic.   Eye: PEERLA. No sclera icterus. EOMI.   ENT: Normal oropharynx, no thyromegaly, no mass, no lymphadenopathy.   LUNGS: Clear to auscultation bilaterally. No wheeze/rales/crackles.   HEART: Normal. S1/S2 present. RRR. No murmur/gallops.   ABD: Soft, non-tender, non-distended. Bowel sounds present.   EXT: No pitting edema. No erythema. No tenderness.  Integumentary: No rash, No sore, No petechia.   NEURO: CN III-XII intact. Strength: 5/5 b/l ULE. Sensory intact b/l ULE.     Vital Signs Last 24 Hrs  T(C): 37.4 (12 May 2024 18:37), Max: 38.9 (12 May 2024 15:33)  T(F): 99.3 (12 May 2024 18:37), Max: 102.1 (12 May 2024 15:33)  HR: 70 (12 May 2024 18:37) (70 - 114)  BP: 148/66 (12 May 2024 18:37) (118/56 - 190/74)  BP(mean): --  RR: 19 (12 May 2024 18:37) (16 - 19)  SpO2: 94% (12 May 2024 18:37) (94% - 96%)    Parameters below as of 12 May 2024 18:37  Patient On (Oxygen Delivery Method): nasal cannula  O2 Flow (L/min): 2L      Please see the above notes for Labs and radiology.     Assessment and Plan:     82 yo M with hx of Prostate cancer s/p seed procedure, Paroxysmal A.fib (eliquis), HTN presents to ED for 3-4 days hx of fever with generalized weakness and wheezing and AVENDANO.     Sepsis and Acute hypoxic respiratory failure 2/2 Left sided PNA   - CT chest:  Left lower lobe pneumonia.  - s/p 2L NS bolus, Vanc and Cefepime given in ED.   - c/w  Doxy and ceftriaxone (no recent abx use or hospitalization)  - f/u BCx, Atypical PNA panel, MRSA nares.   - supplemental O2 prn  - supportive care.     Loose stool likely 2/2 abx effect  - patient reports loose stool (not watery) after receiving abx.   - will switch azithromycin to doxy.   -monitor for BM     Prostate cancer s/p seed procedure - f/u outpatient.   Paroxysmal A.fib - c/w home med.   HTN - c/w home med.     DVT ppx: Eliquis  GI ppx: not indicated.   Diet: DASH diet  Activity: as tolerated.     Date seen by the attendin2024  Total time spent: 75 minutes.

## 2024-05-12 NOTE — H&P ADULT - NSHPLABSRESULTS_GEN_ALL_CORE
Vitals:  ============  T(F): 99.3 (12 May 2024 18:37), Max: 102.1 (12 May 2024 15:33)  HR: 70 (12 May 2024 18:37)  BP: 148/66 (12 May 2024 18:37)  RR: 19 (12 May 2024 18:37)  SpO2: 94% (12 May 2024 18:37) (94% - 96%)  temp max in last 48H T(F): , Max: 102.1 (05-12-24 @ 15:33)    =======================================================  Current Antibiotics:    Other medications:  amLODIPine   Tablet 5 milliGRAM(s) Oral daily  apixaban 2.5 milliGRAM(s) Oral every 12 hours  atorvastatin 20 milliGRAM(s) Oral at bedtime  fenofibrate Tablet 145 milliGRAM(s) Oral daily  metoprolol tartrate 25 milliGRAM(s) Oral two times a day  tamsulosin 0.4 milliGRAM(s) Oral at bedtime      =======================================================  Labs:                        11.2   10.54 )-----------( 167      ( 12 May 2024 12:37 )             33.9     05-12    132<L>  |  97<L>  |  18  ----------------------------<  125<H>  4.2   |  21  |  1.5    Ca    9.2      12 May 2024 12:00    TPro  6.4  /  Alb  3.6  /  TBili  1.4<H>  /  DBili  x   /  AST  53<H>  /  ALT  29  /  AlkPhos  62  05-12      Creatinine: 1.5 mg/dL (05-12-24 @ 12:00)            WBC Count: 10.54 K/uL (05-12-24 @ 12:37)    SARS-CoV-2 Result: NotDetec (05-12-24 @ 11:27)      Alkaline Phosphatase: 62 U/L (05-12-24 @ 12:00)  Alanine Aminotransferase (ALT/SGPT): 29 U/L (05-12-24 @ 12:00)  Aspartate Aminotransferase (AST/SGOT): 53 U/L (05-12-24 @ 12:00)  Bilirubin Total: 1.4 mg/dL (05-12-24 @ 12:00)

## 2024-05-12 NOTE — PATIENT PROFILE ADULT - FALL HARM RISK - HARM RISK INTERVENTIONS

## 2024-05-12 NOTE — H&P ADULT - NSHPPHYSICALEXAM_GEN_ALL_CORE
PHYSICAL EXAM:  GENERAL: NAD, lying in bed comfortably  HEAD:  Atraumatic, Normocephalic  EYES: EOMI, PERRLA, conjunctiva and sclera clear  ENT: Moist mucous membranes  NECK: Supple, No JVD  CHEST/LUNG: No wheezes or rhonchi, pt has dec bibasilar breath sounds   HEART: irregular rate and rhythm; No murmurs, rubs, or gallops  ABDOMEN: Bowel sounds present; Soft, Nontender, Nondistended. No hepatomegaly  EXTREMITIES:  2+ Peripheral Pulses, brisk capillary refill. +1 b/l LE pitting edema  NERVOUS SYSTEM:  Alert & Oriented X3, speech clear. No deficits   MSK: FROM all 4 extremities, full and equal strength  SKIN: No rashes or lesions

## 2024-05-12 NOTE — ED PROVIDER NOTE - PHYSICAL EXAMINATION
VITAL SIGNS: I have reviewed nursing notes and confirm.  CONSTITUTIONAL:  in no acute distress.  SKIN: Skin exam is warm and dry, no acute rash.  HEAD: Normocephalic; atraumatic.  EYES: PERRL, EOM intact; conjunctiva and sclera clear.  ENT: No nasal discharge; airway clear.   NECK: Supple; non tender.  CARD: S1, S2 normal; no murmurs, gallops, or rubs. Regular rate and rhythm.  RESP: No wheezes or rhonchi, pt has b/l lower lung rales. Speaking in full sentences.   ABD: soft; non-distended; non-tender; No rebound or guarding. No CVA tenderness.  EXT: Normal ROM. No clubbing, cyanosis or edema.

## 2024-05-12 NOTE — H&P ADULT - HISTORY OF PRESENT ILLNESS
Mr. Anderson is an 83-year-old male with a past medical history of prostate cancer s/p seed procedure, atrial fibrillation (on Eliquis), hypertension, presented to the emergency department for fever lasting 3 to 4 days, accompanied by weakness and wheezing. According to family members, the patient has been self-medicating with Tylenol for symptom relief, although he acknowledges experiencing chest pain and dyspnea upon exertion when walking certain distances. Upon arrival in the emergency department, the patient reports a productive cough, fever, chills, and shortness of breath.    Vital Signs Last 12 Hrs  T(F): 99.3 (05-12-24 @ 18:37), Max: 102.1 (05-12-24 @ 15:33)  HR: 70 (05-12-24 @ 18:37) (70 - 114)  BP: 148/66 (05-12-24 @ 18:37) (118/56 - 190/74)-  RR: 19 (05-12-24 @ 18:37) (16 - 19)  SpO2: 94% (05-12-24 @ 18:37) (94% - 96%)    Labs in the ED are significant for Auto Neutrophil # of 9.48, Hgb 11.2 (baseline 14.8), Trop 21 -> 18, Cr 1.5 (Baseline1.2-1.4), BNP 3779, TBili 1.4, AST 53, Serum Lactate 1.7, VBG Lactate 2.7, Serum K 4.2 (hemolyzed), VBG K 8.2.    EKG in the ED Atrial fibrillation w/Right bundle branch block with a HR of 85 BPM.  CT Chest Non-Cont performed on admission showed Left lower lobe pneumonia. In the ED, pt was given Cefepime and Vanc x1 and 2 L IVF Bolus. Pt admitted to medicine sepsis 2/2 pneumonia.    Mr. Anderson is an 83-year-old male with a past medical history of prostate cancer s/p seed procedure, atrial fibrillation (on Eliquis), hypertension who presented to the emergency department for fever lasting 3 to 4 days, accompanied by weakness and wheezing. According to family members, the patient has been self-medicating with Tylenol for symptom relief, although he acknowledges experiencing chest pain and dyspnea upon exertion when walking certain distances. Pt's son brought the pt to ED as his symptoms were not improving. Upon arrival in the emergency department, the patient reports a productive cough with green/yellow phlegm, fever, chills, and shortness of breath. He denies any abd pain, nausea, vomiting, diarrhea and constipation.     Vital Signs Last 12 Hrs  T(F): 99.3 (05-12-24 @ 18:37), Max: 102.1 (05-12-24 @ 15:33)  HR: 70 (05-12-24 @ 18:37) (70 - 114)  BP: 148/66 (05-12-24 @ 18:37) (118/56 - 190/74)-  RR: 19 (05-12-24 @ 18:37) (16 - 19)  SpO2: 94% (05-12-24 @ 18:37) (94% - 96%)    Labs in the ED are significant for Auto Neutrophil # of 9.48, Hgb 11.2 (baseline 14.8), Trop 21 -> 18, Cr 1.5 (Baseline1.2-1.4), BNP 3779, TBili 1.4, AST 53, Serum Lactate 1.7, VBG Lactate 2.7, Serum K 4.2 (hemolyzed), VBG K 8.2.    EKG in the ED Atrial fibrillation w/Right bundle branch block with a HR of 85 BPM.  CT Chest Non-Cont performed on admission showed Left lower lobe pneumonia. In the ED, pt was given Cefepime and Vanc x1 and 2 L IVF Bolus. Pt admitted to medicine sepsis 2/2 pneumonia.

## 2024-05-12 NOTE — ED PROVIDER NOTE - OBJECTIVE STATEMENT
83-year-old male history of prostate cancer status post seed procedure, A-fib on Eliquis, HTN presenting to ED for evaluation of 3 to 4 days of fever with associated weakness and wheezing.  As per family patient has been taking Tylenol for symptoms but endorses that he is had chest pain and some dyspnea on exertion when walking certain distances..  Here in ED patient endorses productive cough, fever, chills, SOB

## 2024-05-12 NOTE — ED ADULT NURSE NOTE - SUICIDE SCREENING DEPRESSION
----- Message from Vish Sierra MD sent at 3/15/2019  4:44 PM CDT -----  Mammogram negative for malignancy.  Ultrasound breast reveals benign cysts.  Recommendation yearly screening mammogram and a follow-up ultrasound right breast in 6 months to continue to demonstrate stability of breast cysts Advised   Negative

## 2024-05-13 LAB
ALBUMIN SERPL ELPH-MCNC: 3.5 G/DL — SIGNIFICANT CHANGE UP (ref 3.5–5.2)
ALP SERPL-CCNC: 72 U/L — SIGNIFICANT CHANGE UP (ref 30–115)
ALT FLD-CCNC: 33 U/L — SIGNIFICANT CHANGE UP (ref 0–41)
ANION GAP SERPL CALC-SCNC: 8 MMOL/L — SIGNIFICANT CHANGE UP (ref 7–14)
AST SERPL-CCNC: 60 U/L — HIGH (ref 0–41)
BASOPHILS # BLD AUTO: 0.02 K/UL — SIGNIFICANT CHANGE UP (ref 0–0.2)
BASOPHILS NFR BLD AUTO: 0.2 % — SIGNIFICANT CHANGE UP (ref 0–1)
BILIRUB SERPL-MCNC: 1 MG/DL — SIGNIFICANT CHANGE UP (ref 0.2–1.2)
BUN SERPL-MCNC: 18 MG/DL — SIGNIFICANT CHANGE UP (ref 10–20)
CALCIUM SERPL-MCNC: 9.1 MG/DL — SIGNIFICANT CHANGE UP (ref 8.4–10.5)
CHLORIDE SERPL-SCNC: 99 MMOL/L — SIGNIFICANT CHANGE UP (ref 98–110)
CO2 SERPL-SCNC: 28 MMOL/L — SIGNIFICANT CHANGE UP (ref 17–32)
CREAT SERPL-MCNC: 1 MG/DL — SIGNIFICANT CHANGE UP (ref 0.7–1.5)
EGFR: 74 ML/MIN/1.73M2 — SIGNIFICANT CHANGE UP
EOSINOPHIL # BLD AUTO: 0.01 K/UL — SIGNIFICANT CHANGE UP (ref 0–0.7)
EOSINOPHIL NFR BLD AUTO: 0.1 % — SIGNIFICANT CHANGE UP (ref 0–8)
GLUCOSE SERPL-MCNC: 162 MG/DL — HIGH (ref 70–99)
HCT VFR BLD CALC: 34.6 % — LOW (ref 42–52)
HGB BLD-MCNC: 11.4 G/DL — LOW (ref 14–18)
IMM GRANULOCYTES NFR BLD AUTO: 0.8 % — HIGH (ref 0.1–0.3)
IRON SATN MFR SERPL: 12 % — LOW (ref 15–50)
IRON SATN MFR SERPL: 20 UG/DL — LOW (ref 35–150)
LACTATE SERPL-SCNC: 1.6 MMOL/L — SIGNIFICANT CHANGE UP (ref 0.7–2)
LACTATE SERPL-SCNC: 2.4 MMOL/L — HIGH (ref 0.7–2)
LYMPHOCYTES # BLD AUTO: 0.34 K/UL — LOW (ref 1.2–3.4)
LYMPHOCYTES # BLD AUTO: 2.8 % — LOW (ref 20.5–51.1)
MCHC RBC-ENTMCNC: 29.8 PG — SIGNIFICANT CHANGE UP (ref 27–31)
MCHC RBC-ENTMCNC: 32.9 G/DL — SIGNIFICANT CHANGE UP (ref 32–37)
MCV RBC AUTO: 90.6 FL — SIGNIFICANT CHANGE UP (ref 80–94)
MONOCYTES # BLD AUTO: 0.62 K/UL — HIGH (ref 0.1–0.6)
MONOCYTES NFR BLD AUTO: 5 % — SIGNIFICANT CHANGE UP (ref 1.7–9.3)
MRSA PCR RESULT.: NEGATIVE — SIGNIFICANT CHANGE UP
NEUTROPHILS # BLD AUTO: 11.19 K/UL — HIGH (ref 1.4–6.5)
NEUTROPHILS NFR BLD AUTO: 91.1 % — HIGH (ref 42.2–75.2)
NRBC # BLD: 0 /100 WBCS — SIGNIFICANT CHANGE UP (ref 0–0)
PLATELET # BLD AUTO: 195 K/UL — SIGNIFICANT CHANGE UP (ref 130–400)
PMV BLD: 9.8 FL — SIGNIFICANT CHANGE UP (ref 7.4–10.4)
POTASSIUM SERPL-MCNC: 3.6 MMOL/L — SIGNIFICANT CHANGE UP (ref 3.5–5)
POTASSIUM SERPL-SCNC: 3.6 MMOL/L — SIGNIFICANT CHANGE UP (ref 3.5–5)
PROT SERPL-MCNC: 6 G/DL — SIGNIFICANT CHANGE UP (ref 6–8)
RBC # BLD: 3.82 M/UL — LOW (ref 4.7–6.1)
RBC # FLD: 14.1 % — SIGNIFICANT CHANGE UP (ref 11.5–14.5)
SODIUM SERPL-SCNC: 135 MMOL/L — SIGNIFICANT CHANGE UP (ref 135–146)
TIBC SERPL-MCNC: 170 UG/DL — LOW (ref 220–430)
TRANSFERRIN SERPL-MCNC: 141 MG/DL — LOW (ref 200–360)
UIBC SERPL-MCNC: 150 UG/DL — SIGNIFICANT CHANGE UP (ref 110–370)
WBC # BLD: 12.28 K/UL — HIGH (ref 4.8–10.8)
WBC # FLD AUTO: 12.28 K/UL — HIGH (ref 4.8–10.8)

## 2024-05-13 PROCEDURE — 99232 SBSQ HOSP IP/OBS MODERATE 35: CPT

## 2024-05-13 RX ORDER — SENNA PLUS 8.6 MG/1
2 TABLET ORAL AT BEDTIME
Refills: 0 | Status: DISCONTINUED | OUTPATIENT
Start: 2024-05-13 | End: 2024-05-15

## 2024-05-13 RX ORDER — FERROUS SULFATE 325(65) MG
325 TABLET ORAL DAILY
Refills: 0 | Status: DISCONTINUED | OUTPATIENT
Start: 2024-05-13 | End: 2024-05-15

## 2024-05-13 RX ADMIN — Medication 100 MILLIGRAM(S): at 05:20

## 2024-05-13 RX ADMIN — CEFTRIAXONE 100 MILLIGRAM(S): 500 INJECTION, POWDER, FOR SOLUTION INTRAMUSCULAR; INTRAVENOUS at 21:53

## 2024-05-13 RX ADMIN — Medication 100 MILLIGRAM(S): at 18:40

## 2024-05-13 RX ADMIN — Medication 325 MILLIGRAM(S): at 11:11

## 2024-05-13 RX ADMIN — APIXABAN 2.5 MILLIGRAM(S): 2.5 TABLET, FILM COATED ORAL at 18:38

## 2024-05-13 RX ADMIN — AMLODIPINE BESYLATE 5 MILLIGRAM(S): 2.5 TABLET ORAL at 05:19

## 2024-05-13 RX ADMIN — SENNA PLUS 2 TABLET(S): 8.6 TABLET ORAL at 21:54

## 2024-05-13 RX ADMIN — Medication 145 MILLIGRAM(S): at 11:11

## 2024-05-13 RX ADMIN — TAMSULOSIN HYDROCHLORIDE 0.4 MILLIGRAM(S): 0.4 CAPSULE ORAL at 21:53

## 2024-05-13 RX ADMIN — APIXABAN 2.5 MILLIGRAM(S): 2.5 TABLET, FILM COATED ORAL at 05:19

## 2024-05-13 RX ADMIN — Medication 25 MILLIGRAM(S): at 05:20

## 2024-05-13 RX ADMIN — Medication 25 MILLIGRAM(S): at 18:39

## 2024-05-13 RX ADMIN — ATORVASTATIN CALCIUM 20 MILLIGRAM(S): 80 TABLET, FILM COATED ORAL at 21:53

## 2024-05-13 NOTE — PHYSICAL THERAPY INITIAL EVALUATION ADULT - GENERAL OBSERVATIONS, REHAB EVAL
9:00-9:30 pt encountered in bedside chair, IN NAD.  SPO2 92%,  BPM.  SPO2 after stair climbing 89%, . Patient performed bed mobility, transfers, ambulated, and climbed steps  with assistance, limited by decreased endurance and unsteady gait.  Pt would benefit from Home PT to restore prior level of mobility and safety.

## 2024-05-13 NOTE — PHYSICAL THERAPY INITIAL EVALUATION ADULT - PERTINENT HX OF CURRENT PROBLEM, REHAB EVAL
Mr. Anderson is an 83-year-old male with a past medical history of prostate cancer s/p seed procedure, atrial fibrillation (on Eliquis), hypertension who presented to the emergency department for fever lasting 3 to 4 days, accompanied by weakness and wheezing. According to family members, the patient has been self-medicating with Tylenol for symptom relief, although he acknowledges experiencing chest pain and dyspnea upon exertion when walking certain distances. Pt's son brought the pt to ED as his symptoms were not improving. Upon arrival in the emergency department, the patient reports a productive cough with green/yellow phlegm, fever, chills, and shortness of breath. He denies any abd pain, nausea, vomiting, diarrhea and constipation.

## 2024-05-13 NOTE — PROGRESS NOTE ADULT - ASSESSMENT
82 yo M with hx of Prostate cancer s/p seed procedure, Paroxysmal A.fib (eliquis), HTN presents to ED for 3-4 days hx of fever with generalized weakness and wheezing and AVENDANO.     Sepsis and Acute hypoxic respiratory failure 2/2 Left sided PNA   - CT chest:  Left lower lobe pneumonia.  - s/p 2L NS bolus, Vanc and Cefepime given in ED.   - c/w  Doxy and ceftriaxone (no recent abx use or hospitalization)  - all work up neg TD  - supplemental O2 prn  - supportive care.     Loose stool likely 2/2 abx effect  - patient reports loose stool (not watery) after receiving abx.   - will switch azithromycin to doxy.   - improved    Prostate cancer s/p seed procedure - f/u outpatient.   Paroxysmal A.fib - c/w home med.   HTN - c/w home med.     DVT ppx: Eliquis  GI ppx: not indicated.   Diet: DASH diet  Activity: as tolerated.

## 2024-05-13 NOTE — PHYSICAL THERAPY INITIAL EVALUATION ADULT - FOLLOWS COMMANDS/ANSWERS QUESTIONS, REHAB EVAL
Pain Summary VAS 8-9/10 6/10   Functional questionnaire LEFS 93%    ROM flexion 62 110 deg    extension 0 0   Strength quad Poor + Fair    ABD      flexion          RESTRICTIONS/PRECAUTIONS: OA, bowel/bladder issues, 2 shoulder surgeries, B foot surgery, back surgery, gall bladder removal    Exercises/Interventions:     Therapeutic Ex Sets/reps Notes   Long sit hamstring stretch 4 x 20\" HEP   gastroc stretch with strap 4 x 20\" HEP   Quad set NMES x 4 min HEP   SB flexion 5\" x 15         EOB knee flexion hang With MHP x 6 min  PT support of L leg   SLR  NMES x 3  min Independent   SAQ NMES 3 min     Standing marches UE support X 20 HEP   Mini squats UE support X 20 HEP   Heelslides with a strap 10 sec x 10 Add to HEP   LAQ 2 x 10              Bike  X 8  Min  Able to make revolution - 104 deg        Manual Intervention     Pat mobs, Gentle oscillations Knee PROM Supine and sitting, gastroc stretch,  15'                             NMR re-education     Gait raining with cane X 5 min Good proper mechanics with cane                                               Therapeutic Exercise and NMR EXR  [x] (37587) Provided verbal/tactile cueing for activities related to strengthening, flexibility, endurance, ROM for improvements in LE, proximal hip, and core control with self care, mobility, lifting, ambulation.  [] (17284) Provided verbal/tactile cueing for activities related to improving balance, coordination, kinesthetic sense, posture, motor skill, proprioception  to assist with LE, proximal hip, and core control in self care, mobility, lifting, ambulation and eccentric single leg control.      NMR and Therapeutic Activities:    [] (01806 or 02264) Provided verbal/tactile cueing for activities related to improving balance, coordination, kinesthetic sense, posture, motor skill, proprioception and motor activation to allow for proper function of core, proximal hip and LE with self care and ADLs  [] (10902) Gait Re-education- Provided training and instruction to the patient for proper LE, core and proximal hip recruitment and positioning and eccentric body weight control with ambulation re-education including up and down stairs     Home Exercise Program:    [x] (94473) Reviewed/Progressed HEP activities related to strengthening, flexibility, endurance, ROM of core, proximal hip and LE for functional self-care, mobility, lifting and ambulation/stair navigation   [] (36464)Reviewed/Progressed HEP activities related to improving balance, coordination, kinesthetic sense, posture, motor skill, proprioception of core, proximal hip and LE for self care, mobility, lifting, and ambulation/stair navigation      Manual Treatments:  PROM / STM / Oscillations-Mobs:  G-I, II, III, IV (PA's, Inf., Post.)  [x] (78166) Provided manual therapy to mobilize LE, proximal hip and/or LS spine soft tissue/joints for the purpose of modulating pain, promoting relaxation,  increasing ROM, reducing/eliminating soft tissue swelling/inflammation/restriction, improving soft tissue extensibility and allowing for proper ROM for normal function with self care, mobility, lifting and ambulation. Modalities  Vaso x 15'         Charges:  Timed Code Treatment Minutes: 45   Total Treatment Minutes: 60     [] EVAL (LOW) 66274 (typically 20 minutes face-to-face)  [] EVAL (MOD) 69696 (typically 30 minutes face-to-face)  [] EVAL (HIGH) 37488 (typically 45 minutes face-to-face)  [] RE-EVAL     [x] WZ(79692) x  2   [] IONTO  [] NMR (36543) x      [] VASO  [x] Manual (22931) x  1    [] Other:  [] TA x       [] Mech Traction (48966)  [] ES(attended) (79289)      [] ES (un) (93008):     GOALS:    Patient stated goal: decrease swelling; increase flexibility     Therapist goals for Patient:   Short Term Goals: To be achieved in: 2 weeks  1. Independent in HEP and progression per patient tolerance, in order to prevent re-injury. MET  2.  Patient will have a decrease in pain to easily distractable/100% of the time/able to follow single-step instructions

## 2024-05-14 LAB
ANION GAP SERPL CALC-SCNC: 10 MMOL/L — SIGNIFICANT CHANGE UP (ref 7–14)
BUN SERPL-MCNC: 19 MG/DL — SIGNIFICANT CHANGE UP (ref 10–20)
CALCIUM SERPL-MCNC: 9.3 MG/DL — SIGNIFICANT CHANGE UP (ref 8.4–10.4)
CHLORIDE SERPL-SCNC: 98 MMOL/L — SIGNIFICANT CHANGE UP (ref 98–110)
CO2 SERPL-SCNC: 25 MMOL/L — SIGNIFICANT CHANGE UP (ref 17–32)
CREAT SERPL-MCNC: 1.1 MG/DL — SIGNIFICANT CHANGE UP (ref 0.7–1.5)
CULTURE RESULTS: SIGNIFICANT CHANGE UP
D DIMER BLD IA.RAPID-MCNC: 370 NG/ML DDU — HIGH
EGFR: 66 ML/MIN/1.73M2 — SIGNIFICANT CHANGE UP
FERRITIN SERPL-MCNC: 1174 NG/ML — HIGH (ref 30–400)
GLUCOSE SERPL-MCNC: 141 MG/DL — HIGH (ref 70–99)
HCT VFR BLD CALC: 32.8 % — LOW (ref 42–52)
HGB BLD-MCNC: 10.9 G/DL — LOW (ref 14–18)
MCHC RBC-ENTMCNC: 30 PG — SIGNIFICANT CHANGE UP (ref 27–31)
MCHC RBC-ENTMCNC: 33.2 G/DL — SIGNIFICANT CHANGE UP (ref 32–37)
MCV RBC AUTO: 90.4 FL — SIGNIFICANT CHANGE UP (ref 80–94)
NRBC # BLD: 0 /100 WBCS — SIGNIFICANT CHANGE UP (ref 0–0)
PLATELET # BLD AUTO: 236 K/UL — SIGNIFICANT CHANGE UP (ref 130–400)
PMV BLD: 9.7 FL — SIGNIFICANT CHANGE UP (ref 7.4–10.4)
POTASSIUM SERPL-MCNC: 3.8 MMOL/L — SIGNIFICANT CHANGE UP (ref 3.5–5)
POTASSIUM SERPL-SCNC: 3.8 MMOL/L — SIGNIFICANT CHANGE UP (ref 3.5–5)
PROCALCITONIN SERPL-MCNC: 0.52 NG/ML — HIGH (ref 0.02–0.1)
RBC # BLD: 3.63 M/UL — LOW (ref 4.7–6.1)
RBC # FLD: 14.3 % — SIGNIFICANT CHANGE UP (ref 11.5–14.5)
SODIUM SERPL-SCNC: 133 MMOL/L — LOW (ref 135–146)
SPECIMEN SOURCE: SIGNIFICANT CHANGE UP
WBC # BLD: 11.53 K/UL — HIGH (ref 4.8–10.8)
WBC # FLD AUTO: 11.53 K/UL — HIGH (ref 4.8–10.8)

## 2024-05-14 PROCEDURE — 99232 SBSQ HOSP IP/OBS MODERATE 35: CPT

## 2024-05-14 RX ORDER — FUROSEMIDE 40 MG
40 TABLET ORAL DAILY
Refills: 0 | Status: DISCONTINUED | OUTPATIENT
Start: 2024-05-14 | End: 2024-05-15

## 2024-05-14 RX ADMIN — Medication 100 MILLIGRAM(S): at 17:19

## 2024-05-14 RX ADMIN — ATORVASTATIN CALCIUM 20 MILLIGRAM(S): 80 TABLET, FILM COATED ORAL at 21:15

## 2024-05-14 RX ADMIN — Medication 25 MILLIGRAM(S): at 05:17

## 2024-05-14 RX ADMIN — AMLODIPINE BESYLATE 5 MILLIGRAM(S): 2.5 TABLET ORAL at 05:18

## 2024-05-14 RX ADMIN — Medication 100 MILLIGRAM(S): at 05:12

## 2024-05-14 RX ADMIN — APIXABAN 2.5 MILLIGRAM(S): 2.5 TABLET, FILM COATED ORAL at 05:18

## 2024-05-14 RX ADMIN — Medication 145 MILLIGRAM(S): at 11:52

## 2024-05-14 RX ADMIN — CEFTRIAXONE 100 MILLIGRAM(S): 500 INJECTION, POWDER, FOR SOLUTION INTRAMUSCULAR; INTRAVENOUS at 21:15

## 2024-05-14 RX ADMIN — TAMSULOSIN HYDROCHLORIDE 0.4 MILLIGRAM(S): 0.4 CAPSULE ORAL at 21:15

## 2024-05-14 RX ADMIN — APIXABAN 2.5 MILLIGRAM(S): 2.5 TABLET, FILM COATED ORAL at 17:13

## 2024-05-14 RX ADMIN — Medication 25 MILLIGRAM(S): at 17:13

## 2024-05-14 RX ADMIN — Medication 325 MILLIGRAM(S): at 11:53

## 2024-05-14 NOTE — DISCHARGE NOTE PROVIDER - NSDCQMAMI_CARD_ALL_CORE
Home med: Gabapentin 800mg TID  - due to renal function will decrease Gabapentin to 600mg TID No Home med: Gabapentin 800mg TID  - due to renal function will decrease Gabapentin to 600mg TID Home med: Gabapentin 800mg TID  - due to renal function will decrease Gabapentin to 600mg TID Home med: Gabapentin 800mg TID  - due to renal function will decrease Gabapentin to 600mg TID Home med: Gabapentin 800mg TID  - due to renal function will decrease Gabapentin to 600mg TID Home med: Gabapentin 800mg TID  - due to renal function will decrease Gabapentin to 600mg TID Home med: Gabapentin 800mg TID  - due to renal function will decrease Gabapentin to 600mg TID Home med: Gabapentin 800mg TID  - due to renal function will decrease Gabapentin to 600mg TID Home med: Gabapentin 800mg TID  - due to renal function will decrease Gabapentin to 600mg TID Home med: Gabapentin 800mg TID  - due to renal function will decrease Gabapentin to 600mg TID

## 2024-05-14 NOTE — PROGRESS NOTE ADULT - SUBJECTIVE AND OBJECTIVE BOX
PATTY GILLILAND  [facility] 008 A ([facility])      Patient was evaluated and examined  by bedside, tolerating diet well, no fever, no cough, no dyspnea at rest        REVIEW OF SYSTEMS:  please see pertinent positives mentioned above, all other 12 ROS negative      T(C): , Max: 38.9 (05-12-24 @ 15:33)  HR: 108 (05-13-24 @ 05:00)  BP: 148/77 (05-13-24 @ 05:00)  RR: 19 (05-13-24 @ 05:00)  SpO2: 95% (05-13-24 @ 05:00)  CAPILLARY BLOOD GLUCOSE      PHYSICAL EXAM:  General: NAD, AAOX3, patient is laying comfortably in bed  HEENT: AT, NC, Supple, NO JVD, NO CB  Lungs: good breath sounds  B/L, no wheezing, no rhonchi  CVS: normal S1, S2, RRR, NO M/G/R  Abdomen: soft, bowel sounds present, non-tender, mildly  distended  Extremities: no edema, no clubbing, no cyanosis, positive peripheral pulses b/l  Neuro: no acute focal neurological deficits  Skin: no rash, no ecchymosis      LAB  CBC  Date: 05-13-24 @ 06:34  Mean cell Vqcgycavpa24.8  Mean cell Hemoglobin Conc32.9  Mean cell Volum 90.6  Platelet count-Automate 195  RBC Count 3.82  Red Cell Distrib Width14.1  WBC Count12.28  % Albumin, Urine--  Hematocrit 34.6  Hemoglobin 11.4  CBC  Date: 05-12-24 @ 12:37  Mean cell Cjesqanhvi90.1  Mean cell Hemoglobin Conc33.0  Mean cell Volum 91.1  Platelet count-Automate 167  RBC Count 3.72  Red Cell Distrib Width13.7  WBC Count10.54  % Albumin, Urine--  Hematocrit 33.9  Hemoglobin 11.2    BMP  05-13-24 @ 06:34  Blood Gas Arterial-Calcium,Ionized--  Blood Urea Nitrogen, Serum 18 mg/dL [10 - 20]  Carbon Dioxide, Serum28 mmol/L [17 - 32]  Chloride, Serum99 mmol/L [98 - 110]  Creatinie, Serum1.0 mg/dL [0.7 - 1.5]  Glucose, Jcuiv535 mg/dL<H> [70 - 99]  Potassium, Serum3.6 mmol/L [3.5 - 5.0]  Sodium, Serum 135 mmol/L [135 - 146]  BMP  05-12-24 @ 12:00  Blood Gas Arterial-Calcium,Ionized--  Blood Urea Nitrogen, Serum 18 mg/dL [10 - 20]  Carbon Dioxide, Serum21 mmol/L [17 - 32]  Chloride, Serum97 mmol/L<L> [98 - 110]  Creatinie, Serum1.5 mg/dL [0.7 - 1.5]  Glucose, Eirig699 mg/dL<H> [70 - 99]  Potassium, Serum4.2 mmol/L [3.5 - 5.0] [Slighty Hemolyzed use with Caution]  Sodium, Serum 132 mmol/L<L> [135 - 146]      Medications:  acetaminophen     Tablet .. 650 milliGRAM(s) Oral every 6 hours PRN  albuterol/ipratropium for Nebulization 3 milliLiter(s) Nebulizer every 6 hours PRN  amLODIPine   Tablet 5 milliGRAM(s) Oral daily  apixaban 2.5 milliGRAM(s) Oral every 12 hours  atorvastatin 20 milliGRAM(s) Oral at bedtime  cefTRIAXone   IVPB 1000 milliGRAM(s) IV Intermittent every 24 hours  cefTRIAXone   IVPB      doxycycline IVPB 100 milliGRAM(s) IV Intermittent every 12 hours  fenofibrate Tablet 145 milliGRAM(s) Oral daily  ferrous    sulfate 325 milliGRAM(s) Oral daily  metoprolol tartrate 25 milliGRAM(s) Oral two times a day  senna 2 Tablet(s) Oral at bedtime  tamsulosin 0.4 milliGRAM(s) Oral at bedtime        Assessment and Plan:  Patient is an 82 y/o Male  with hx of Prostate cancer s/p seed procedure, Paroxysmal A.fib (eliquis), HTN presents to ED for 3-4 days hx of fever with generalized weakness and wheezing and Dyspnea, dx. with sepsis due to left lobe pneumonia admitted to medical unit with the following treatment plan:     Sepsis and Acute hypoxic respiratory failure 2/2 Left sided suspected community acquired pneumonia   - CT chest:  Left lower lobe pneumonia.  - s/p 2L NS bolus, Vanc and Cefepime given in ED.   - c/w  Doxy and ceftriaxone (no recent abx use or hospitalization)  - f/u BCxs, Atypical PNA panel, MRSA nares. procal level   - supplemental O2 prn  - supportive care.     Loose stool likely 2/2 abx effect  - patient reports loose stool (not watery) after receiving abx.   -monitor for BM     GRUPO- started on PO iron tx.     Prostate cancer s/p seed procedure - f/u outpatient.   Paroxysmal A.fib - c/w home med.   HTN - c/w home med.     DVT ppx: Eliquis  GI ppx: not indicated.   Diet: DASH diet  Activity: as tolerated.     #Progress Note Handoff: Pending ID work up, IV abx. f/up cBC in am   Family discussion: current medical plan of tx. d/w pt. by bedside Disposition: Home__ in 24 to 48 hours    Total time spent to complete patient's bedside assessment, review medical chart, discuss medical plan of care with covering medical team was more than 35 minutes with >50% of time spent face to face with patient, discussion with patient/family and/or coordination of care        
  PATTY GILLILAND  [facility] 008 A ([facility])        Patient was evaluated and examined  by bedside, reports feeling better, mild cough, no dyspnea at rest, no fever       REVIEW OF SYSTEMS:  please see pertinent positives mentioned above, all other 12 ROS negative      T(C): , Max: 37.1 (05-13-24 @ 12:20)  HR: 100 (05-14-24 @ 05:18)  BP: 165/82 (05-14-24 @ 05:18)  RR: 18 (05-14-24 @ 05:18)  SpO2: 93% (05-14-24 @ 05:18)  CAPILLARY BLOOD GLUCOSE          PHYSICAL EXAM:  General: NAD, AAOX3, patient is sitting  comfortably in bed  HEENT: AT, NC, Supple, NO JVD, NO CB  Lungs: good breath sounds B/L, no wheezing, no rhonchi  CVS: normal S1, S2, RRR, NO M/G/R  Abdomen: soft, bowel sounds present, non-tender, non-distended  Extremities: m/l b/l lower ext.  edema, no clubbing, no cyanosis, positive peripheral pulses b/l  Neuro: no acute focal neurological deficits  Skin: no rash, no ecchymosis      LAB  CBC  Date: 05-14-24 @ 06:23  Mean cell Yayrouxvea75.0  Mean cell Hemoglobin Conc33.2  Mean cell Volum 90.4  Platelet count-Automate 236  RBC Count 3.63  Red Cell Distrib Width14.3  WBC Count11.53  % Albumin, Urine--  Hematocrit 32.8  Hemoglobin 10.9  CBC  Date: 05-13-24 @ 06:34  Mean cell Alewgyohqd68.8  Mean cell Hemoglobin Conc32.9  Mean cell Volum 90.6  Platelet count-Automate 195  RBC Count 3.82  Red Cell Distrib Width14.1  WBC Count12.28  % Albumin, Urine--  Hematocrit 34.6  Hemoglobin 11.4  CBC  Date: 05-12-24 @ 12:37  Mean cell Mesijdzghw06.1  Mean cell Hemoglobin Conc33.0  Mean cell Volum 91.1  Platelet count-Automate 167  RBC Count 3.72  Red Cell Distrib Width13.7  WBC Count10.54  % Albumin, Urine--  Hematocrit 33.9  Hemoglobin 11.2    BMP  05-14-24 @ 06:23  Blood Gas Arterial-Calcium,Ionized--  Blood Urea Nitrogen, Serum 19 mg/dL [10 - 20]  Carbon Dioxide, Serum25 mmol/L [17 - 32]  Chloride, Serum98 mmol/L [98 - 110]  Creatinie, Serum1.1 mg/dL [0.7 - 1.5]  Glucose, Egveq105 mg/dL<H> [70 - 99]  Potassium, Serum3.8 mmol/L [3.5 - 5.0]  Sodium, Serum 133 mmol/L<L> [135 - 146]  BMP  05-13-24 @ 06:34  Blood Gas Arterial-Calcium,Ionized--  Blood Urea Nitrogen, Serum 18 mg/dL [10 - 20]  Carbon Dioxide, Serum28 mmol/L [17 - 32]  Chloride, Serum99 mmol/L [98 - 110]  Creatinie, Serum1.0 mg/dL [0.7 - 1.5]  Glucose, Fjhvi608 mg/dL<H> [70 - 99]  Potassium, Serum3.6 mmol/L [3.5 - 5.0]  Sodium, Serum 135 mmol/L [135 - 146]  [facility]  05-12-24 @ 12:00  Blood Gas Arterial-Calcium,Ionized--  Blood Urea Nitrogen, Serum 18 mg/dL [10 - 20]  Carbon Dioxide, Serum21 mmol/L [17 - 32]  Chloride, Serum97 mmol/L<L> [98 - 110]  Creatinie, Serum1.5 mg/dL [0.7 - 1.5]  Glucose, Hjbxi231 mg/dL<H> [70 - 99]  Potassium, Serum4.2 mmol/L [3.5 - 5.0] [Slighty Hemolyzed use with Caution]  Sodium, Serum 132 mmol/L<L> [135 - 146]              Microbiology:    Culture - Blood (collected 05-12-24 @ 12:16)  Source: .Blood Blood-Peripheral  Preliminary Report (05-13-24 @ 17:03):    No growth at 24 hours    Culture - Blood (collected 05-12-24 @ 12:16)  Source: .Blood Blood-Peripheral  Preliminary Report (05-13-24 @ 17:03):    No growth at 24 hours        Medications:  acetaminophen     Tablet .. 650 milliGRAM(s) Oral every 6 hours PRN  albuterol/ipratropium for Nebulization 3 milliLiter(s) Nebulizer every 6 hours PRN  amLODIPine   Tablet 5 milliGRAM(s) Oral daily  apixaban 2.5 milliGRAM(s) Oral every 12 hours  atorvastatin 20 milliGRAM(s) Oral at bedtime  cefTRIAXone   IVPB 1000 milliGRAM(s) IV Intermittent every 24 hours  cefTRIAXone   IVPB      doxycycline IVPB 100 milliGRAM(s) IV Intermittent every 12 hours  fenofibrate Tablet 145 milliGRAM(s) Oral daily  ferrous    sulfate 325 milliGRAM(s) Oral daily  metoprolol tartrate 25 milliGRAM(s) Oral two times a day  senna 2 Tablet(s) Oral at bedtime  tamsulosin 0.4 milliGRAM(s) Oral at bedtime        Assessment and Plan:  Patient is an 84 y/o Male  with hx of Prostate cancer s/p seed procedure, Paroxysmal A.fib (eliquis), HTN presents to ED for 3-4 days hx of fever with generalized weakness and wheezing and Dyspnea, dx. with sepsis due to left lobe pneumonia admitted to medical unit with the following treatment plan:     Sepsis and Acute hypoxic respiratory failure 2/2 Left sided suspected community acquired pneumonia   - CT chest:  Left lower lobe pneumonia.  - s/p 2L NS bolus, Vanc and Cefepime given in ED.   - c/w  Doxy and ceftriaxone (no recent abx use or hospitalization)  - f/u BCxs- so far no growth, Atypical PNA panel, MRSA nares neg , procal level - 0.54, RVP neg  - supplemental O2 prn  - supportive care.     lower ext edema- obtain lower ext. venous doppler, d-dimer level   - d/c norvasc, start on Lasix 40 mg po once daily     Loose stool likely 2/2 abx effect  - patient reports loose stool (not watery) after receiving abx.   -monitor for BM     GRUPO- started on PO iron tx.     Prostate cancer s/p seed procedure - f/u outpatient.   Paroxysmal A.fib - c/w home med.   HTN -  d/c Norvasc, start Lasix 40 mg po once daily  , close outpatient BMP monitoring with PCP    DVT ppx: Eliquis  GI ppx: not indicated.   Diet: DASH diet  Activity: as tolerated.     #Progress Note Handoff: obtain lower ext. venous doppler, f/up ddimer level, d/c Norvasc, start Lasix 40 mg po once daily, obtain pulse ox on RA during ambulation, continue IV abx. and transition to PO in 24 hours   Family discussion: current medical plan of tx. d/w pt. by bedside Disposition: Home__ in 24  hours    Total time spent to complete patient's bedside assessment, review medical chart, discuss medical plan of care with covering medical team was more than 55 minutes with >50% of time spent face to face with patient, discussion with patient/family and/or coordination of care        
24H events:    Patient is a 83y old Male who presents with a chief complaint of dyspnea (13 May 2024 11:06)    Primary diagnosis of Pneumonia    Family communication:  Contact date:  Name of person contacted:  Relationship to patient:  Communication details:  What matters most:    PAST MEDICAL & SURGICAL HISTORY  BPH (benign prostatic hyperplasia)    Hyperlipidemia    Hypertension    Atrial fibrillation    CSD (cat scratch disease)    Bilateral cataracts    Prostate cancer    H/O cataract extraction  left eye      SOCIAL HISTORY:  Social History:      ALLERGIES:  clindamycin (Pruritus)    MEDICATIONS:  STANDING MEDICATIONS  amLODIPine   Tablet 5 milliGRAM(s) Oral daily  apixaban 2.5 milliGRAM(s) Oral every 12 hours  atorvastatin 20 milliGRAM(s) Oral at bedtime  cefTRIAXone   IVPB 1000 milliGRAM(s) IV Intermittent every 24 hours  cefTRIAXone   IVPB      doxycycline IVPB 100 milliGRAM(s) IV Intermittent every 12 hours  fenofibrate Tablet 145 milliGRAM(s) Oral daily  ferrous    sulfate 325 milliGRAM(s) Oral daily  metoprolol tartrate 25 milliGRAM(s) Oral two times a day  senna 2 Tablet(s) Oral at bedtime  tamsulosin 0.4 milliGRAM(s) Oral at bedtime    PRN MEDICATIONS  acetaminophen     Tablet .. 650 milliGRAM(s) Oral every 6 hours PRN  albuterol/ipratropium for Nebulization 3 milliLiter(s) Nebulizer every 6 hours PRN    VITALS:   T(F): 99.2  HR: 108  BP: 148/77  RR: 19  SpO2: 95%    PHYSICAL EXAM:  GENERAL:   (x  ) NAD, lying in bed comfortably     (  ) obtunded     (  ) lethargic     (  ) somnolent    HEAD:   (x  ) Atraumatic     (  ) hematoma     (  ) laceration (specify location:       )     NECK:  (x  ) Supple     (  ) neck stiffness     (  ) nuchal rigidity     (  )  no JVD     (  ) JVD present ( -- cm)    HEART:  Rate -->     (x  ) normal rate     (  ) bradycardic     (  ) tachycardic  Rhythm -->     (x  ) regular     (  ) regularly irregular     (  ) irregularly irregular  Murmurs -->     (  ) normal s1s2     (  ) systolic murmur     (  ) diastolic murmur     (  ) continuous murmur      (  ) S3 present     (  ) S4 present    LUNGS:   (x  )Unlabored respirations     (  ) tachypnea  ( x ) B/L air entry     (  ) decreased breath sounds in:  (location     )    (  ) no adventitious sound     (  ) crackles     (  ) wheezing      (  ) rhonchi      (specify location:       )  (  ) chest wall tenderness (specify location:       )    ABDOMEN:   ( x ) Soft     (  ) tense   |   x  ) nondistended     (  ) distended   |   (  ) +BS     (  ) hypoactive bowel sounds     (  ) hyperactive bowel sounds  (  ) nontender     (  ) RUQ tenderness     (  ) RLQ tenderness     (  ) LLQ tenderness     (  ) epigastric tenderness     (  ) diffuse tenderness  (  ) Splenomegaly      (  ) Hepatomegaly      (  ) Jaundice     (  ) ecchymosis     EXTREMITIES:  ( x ) Normal     (  ) Rash     (  ) ecchymosis     (  ) varicose veins      (  ) pitting edema     (  ) non-pitting edema   (  ) ulceration     (  ) gangrene:     (location:     )    NERVOUS SYSTEM:    (x) A&Ox3     (  ) confused     (  ) lethargic  CN II-XII:     (  ) Intact     (  ) deficits found     (Specify:     )   Upper extremities:     (  ) no sensorimotor deficits     (  ) weakness     (  ) loss of proprioception/vibration     (  ) loss of touch/temperature (specify:    )  Lower extremities:     (  ) no sensorimotor deficits     (  ) weakness     (  ) loss of proprioception/vibration     (  ) loss of touch/temperature (specify:    )    LABS:                        11.4   12.28 )-----------( 195      ( 13 May 2024 06:34 )             34.6     05-13    135  |  99  |  18  ----------------------------<  162<H>  3.6   |  28  |  1.0    Ca    9.1      13 May 2024 06:34    TPro  6.0  /  Alb  3.5  /  TBili  1.0  /  DBili  x   /  AST  60<H>  /  ALT  33  /  AlkPhos  72  05-13    Urinalysis Basic - ( 13 May 2024 06:34 )    Color: x / Appearance: x / SG: x / pH: x  Gluc: 162 mg/dL / Ketone: x  / Bili: x / Urobili: x   Blood: x / Protein: x / Nitrite: x   Leuk Esterase: x / RBC: x / WBC x   Sq Epi: x / Non Sq Epi: x / Bacteria: x    Lactate, Blood: 2.4 mmol/L *H* (05-13-24 @ 06:34)  Lactate, Blood: 1.7 mmol/L (05-12-24 @ 12:16)

## 2024-05-14 NOTE — DISCHARGE NOTE PROVIDER - CARE PROVIDER_API CALL
Jerri Peterson  Internal Medicine  [address] [place] [address]  Phone: [phone]  Fax: [phone]  Follow Up Time: 2 weeks

## 2024-05-14 NOTE — DISCHARGE NOTE PROVIDER - NSDCMRMEDTOKEN_GEN_ALL_CORE_FT
amLODIPine 5 mg oral tablet: 1 tab(s) orally once a day  atorvastatin 20 mg oral tablet: 1 orally once a day  Eliquis 5 mg oral tablet: 1 orally 2 times a day  fenofibric acid 135 mg oral delayed release capsule: 1 cap(s) orally once a day  Metoprolol Tartrate 25 mg oral tablet: 1 tab(s) orally 2 times a day  tamsulosin 0.4 mg oral capsule: 1 cap(s) orally once a day   amoxicillin-clavulanate 500 mg-125 mg oral tablet: 500 milligram(s) orally every 12 hours  atorvastatin 20 mg oral tablet: 1 orally once a day  Eliquis 5 mg oral tablet: 1 orally 2 times a day  fenofibric acid 135 mg oral delayed release capsule: 1 cap(s) orally once a day  Lasix 20 mg oral tablet: 1 tab(s) orally 3 times a week take it on monday/wednesday/friday or as needed for leg swelling.  Metoprolol Tartrate 25 mg oral tablet: 1 tab(s) orally 2 times a day  tamsulosin 0.4 mg oral capsule: 1 cap(s) orally once a day

## 2024-05-14 NOTE — DISCHARGE NOTE PROVIDER - NSDCFUADDINST_GEN_ALL_CORE_FT
You will have to take lasix 3 times a week for your limb swelling. Discuss about the same with your PCP. You will need close outpatient follow up. You will need repeat CT scan in 6 weeks to check for resolving pneumonia if not should be further investigated.

## 2024-05-14 NOTE — DISCHARGE NOTE PROVIDER - NSDCCPCAREPLAN_GEN_ALL_CORE_FT
PRINCIPAL DISCHARGE DIAGNOSIS  Diagnosis: Pneumonia  Assessment and Plan of Treatment: You came in with SOB and fevers. You were requiring IV abx in the hospital. You will need to finish a course of abx. We started you on a medicine called lasix to reduce your limb swelling and held your norvasc. Please follow up with your PCP about the same for a tighter BP control.      SECONDARY DISCHARGE DIAGNOSES  Diagnosis: Sepsis  Assessment and Plan of Treatment:      PRINCIPAL DISCHARGE DIAGNOSIS  Diagnosis: Pneumonia  Assessment and Plan of Treatment: You came in with SOB and fevers. You were requiring IV abx in the hospital. You will need to finish a course of abx. We started you on a medicine called lasix to reduce your limb swelling and held your norvasc. Please follow up with your PCP about the same for a tighter BP control.  You will have to take lasix 3 times a week for your limb swelling. Discuss about the same with your PCP. You will need close outpatient follow up. You will need repeat CT scan in 6 weeks to check for resolving pneumonia if not should be further investigated.      SECONDARY DISCHARGE DIAGNOSES  Diagnosis: Sepsis  Assessment and Plan of Treatment:

## 2024-05-14 NOTE — DISCHARGE NOTE PROVIDER - HOSPITAL COURSE
Patient is an 82 y/o Male  with hx of Prostate cancer s/p seed procedure, Paroxysmal A.fib (eliquis), HTN presents to ED for 3-4 days hx of fever with generalized weakness and wheezing and Dyspnea, dx. with sepsis due to left lobe pneumonia admitted to medical unit with the following treatment plan:     Sepsis and Acute hypoxic respiratory failure 2/2 Left sided suspected community acquired pneumonia   - CT chest:  Left lower lobe pneumonia.  - s/p 2L NS bolus, Vanc and Cefepime given in ED.   - c/w  Doxy and ceftriaxone (no recent abx use or hospitalization)  - f/u BCxs- so far no growth, Atypical PNA panel, MRSA nares neg , procal level - 0.54, RVP neg  - supplemental O2 prn  - supportive care.     lower ext edema- obtain lower ext. venous doppler, d-dimer level   - d/c norvasc, start on Lasix 40 mg po once daily     Loose stool likely 2/2 abx effect  - patient reports loose stool (not watery) after receiving abx.   - monitor for BM     GRUPO- started on PO iron tx.     Prostate cancer s/p seed procedure - f/u outpatient.   Paroxysmal A.fib - c/w home med.   HTN -  d/c Norvasc, start Lasix 40 mg po once daily  , close outpatient BMP monitoring with PCP    DVT ppx: Eliquis  GI ppx: not indicated.   Diet: DASH diet  Activity: as tolerated.     #Progress Note Handoff: obtain lower ext. venous doppler, f/up ddimer level, d/c Norvasc, start Lasix 40 mg po once daily, obtain pulse ox on RA during ambulation, continue IV abx. and transition to PO in 24 hours   Family discussion: current medical plan of tx. d/w pt. by bedside Disposition: Home__ in 24  hours   Patient is an 82 y/o Male  with hx of Prostate cancer s/p seed procedure, Paroxysmal A.fib (eliquis), HTN presents to ED for 3-4 days hx of fever with generalized weakness and wheezing and Dyspnea, dx. with sepsis due to left lobe pneumonia admitted to medical unit with the following treatment plan:     Sepsis and Acute hypoxic respiratory failure 2/2 Left sided suspected community acquired pneumonia   - CT chest:  Left lower lobe pneumonia.  - s/p 2L NS bolus, Vanc and Cefepime given in ED.   - IV Doxy and ceftriaxone in the hospital, DC home on augmentin for 4 more days  - BCxs- so far no growth, Atypical PNA panel, MRSA nares neg , procal level - 0.54, RVP neg    lower ext edema- obtain lower ext. venous doppler, d-dimer level   - stop amlodipine, duplex neg, lasix 20mg 3 times a week    Prostate cancer s/p seed procedure - f/u outpatient.   Paroxysmal A.fib - c/w home med.   HTN -  d/c Norvasc, lasix 20mg 3 times a week    --> DC home with close outpatient follow up. Pt will need repeat CT scan in 6 weeks to check for resolving pneumonia if not should be further investigated.    Patient is an 84 y/o Male  with hx of Prostate cancer s/p seed procedure, Paroxysmal A.fib (eliquis), HTN presents to ED for 3-4 days hx of fever with generalized weakness and wheezing and Dyspnea, dx. with sepsis due to left lobe pneumonia admitted to medical unit with the following treatment plan:     Sepsis and Acute hypoxic respiratory failure 2/2 Left sided suspected community acquired pneumonia   - CT chest:  Left lower lobe pneumonia.  - s/p 2L NS bolus, Vanc and Cefepime given in ED.   - IV Doxy and ceftriaxone in the hospital, DC home on augmentin for 4 more days  - BCxs- so far no growth, Atypical PNA panel, MRSA nares neg , procal level - 0.54, RVP neg    lower ext edema- obtain lower ext. venous doppler, d-dimer level   - stop amlodipine, duplex neg, lasix 20mg 3 times a week    Prostate cancer s/p seed procedure - f/u outpatient.   Paroxysmal A.fib - c/w home med.   HTN -  d/c Norvasc, lasix 20mg 3 times a week    --> DC home with close outpatient follow up. Pt will need repeat CT scan in 6 weeks to check for resolving pneumonia if not should be further investigated.   plans dw the pt and his son jacek

## 2024-05-15 VITALS
SYSTOLIC BLOOD PRESSURE: 148 MMHG | OXYGEN SATURATION: 95 % | DIASTOLIC BLOOD PRESSURE: 70 MMHG | RESPIRATION RATE: 18 BRPM | TEMPERATURE: 97 F | HEART RATE: 83 BPM

## 2024-05-15 LAB
ANION GAP SERPL CALC-SCNC: 13 MMOL/L — SIGNIFICANT CHANGE UP (ref 7–14)
BUN SERPL-MCNC: 24 MG/DL — HIGH (ref 10–20)
CALCIUM SERPL-MCNC: 10 MG/DL — SIGNIFICANT CHANGE UP (ref 8.4–10.4)
CHLORIDE SERPL-SCNC: 98 MMOL/L — SIGNIFICANT CHANGE UP (ref 98–110)
CO2 SERPL-SCNC: 25 MMOL/L — SIGNIFICANT CHANGE UP (ref 17–32)
CREAT SERPL-MCNC: 1.1 MG/DL — SIGNIFICANT CHANGE UP (ref 0.7–1.5)
EGFR: 66 ML/MIN/1.73M2 — SIGNIFICANT CHANGE UP
FOLATE SERPL-MCNC: 5.6 NG/ML — SIGNIFICANT CHANGE UP
GLUCOSE SERPL-MCNC: 145 MG/DL — HIGH (ref 70–99)
HCT VFR BLD CALC: 36.4 % — LOW (ref 42–52)
HGB BLD-MCNC: 11.9 G/DL — LOW (ref 14–18)
MCHC RBC-ENTMCNC: 29.5 PG — SIGNIFICANT CHANGE UP (ref 27–31)
MCHC RBC-ENTMCNC: 32.7 G/DL — SIGNIFICANT CHANGE UP (ref 32–37)
MCV RBC AUTO: 90.1 FL — SIGNIFICANT CHANGE UP (ref 80–94)
NRBC # BLD: 0 /100 WBCS — SIGNIFICANT CHANGE UP (ref 0–0)
PLATELET # BLD AUTO: 320 K/UL — SIGNIFICANT CHANGE UP (ref 130–400)
PMV BLD: 9.3 FL — SIGNIFICANT CHANGE UP (ref 7.4–10.4)
POTASSIUM SERPL-MCNC: 3.9 MMOL/L — SIGNIFICANT CHANGE UP (ref 3.5–5)
POTASSIUM SERPL-SCNC: 3.9 MMOL/L — SIGNIFICANT CHANGE UP (ref 3.5–5)
RBC # BLD: 4.04 M/UL — LOW (ref 4.7–6.1)
RBC # FLD: 14.6 % — HIGH (ref 11.5–14.5)
SODIUM SERPL-SCNC: 136 MMOL/L — SIGNIFICANT CHANGE UP (ref 135–146)
VIT B12 SERPL-MCNC: 1347 PG/ML — HIGH (ref 232–1245)
WBC # BLD: 12.73 K/UL — HIGH (ref 4.8–10.8)
WBC # FLD AUTO: 12.73 K/UL — HIGH (ref 4.8–10.8)

## 2024-05-15 PROCEDURE — 93970 EXTREMITY STUDY: CPT | Mod: 26

## 2024-05-15 PROCEDURE — 99239 HOSP IP/OBS DSCHRG MGMT >30: CPT

## 2024-05-15 RX ORDER — AMLODIPINE BESYLATE 2.5 MG/1
1 TABLET ORAL
Qty: 0 | Refills: 0 | DISCHARGE

## 2024-05-15 RX ORDER — FUROSEMIDE 40 MG
1 TABLET ORAL
Qty: 13 | Refills: 0
Start: 2024-05-15 | End: 2024-06-13

## 2024-05-15 RX ADMIN — APIXABAN 2.5 MILLIGRAM(S): 2.5 TABLET, FILM COATED ORAL at 05:04

## 2024-05-15 RX ADMIN — Medication 25 MILLIGRAM(S): at 05:04

## 2024-05-15 RX ADMIN — Medication 100 MILLIGRAM(S): at 05:03

## 2024-05-15 RX ADMIN — Medication 40 MILLIGRAM(S): at 05:04

## 2024-05-15 RX ADMIN — Medication 325 MILLIGRAM(S): at 11:49

## 2024-05-15 RX ADMIN — Medication 145 MILLIGRAM(S): at 11:49

## 2024-05-15 NOTE — CHART NOTE - NSCHARTNOTEFT_GEN_A_CORE
Pt would benefit from rolling walker as recommended by PT. Pt is deconditioned due to his Hx of prostate cancer s/p SEED, atrial fibrillation (on eliquis ) and Hypertension. Pt would benefit from rolling walker as recommended by PT. Pt is deconditioned due to his Hx of prostate cancer s/p SEED, atrial fibrillation (on eliquis ) and Hypertension

## 2024-05-15 NOTE — DISCHARGE NOTE NURSING/CASE MANAGEMENT/SOCIAL WORK - NSDCPEFALRISK_GEN_ALL_CORE
For information on Fall & Injury Prevention, visit: [email or web address] OR  [email or web address] OR  https://www.cdc.gov/steadi/patient.html

## 2024-05-15 NOTE — DISCHARGE NOTE NURSING/CASE MANAGEMENT/SOCIAL WORK - PATIENT PORTAL LINK FT
You can access the FollowMyHealth Patient Portal offered by [facility] by registering at the following website: [email or web address]. By joining [facility]’s FollowMyHealth portal, you will also be able to view your health information using other applications (apps) compatible with our system.

## 2024-05-15 NOTE — DISCHARGE NOTE NURSING/CASE MANAGEMENT/SOCIAL WORK - NSDCPEELIQUISDIET_GEN_ALL_CORE
Reviewed contraception  Options with patient.   Again reviewed recommendations for COVID vaccine. Patient declines today.    Eat healthy foods you enjoy. Apixaban/Eliquis DOES NOT have a special diet. Limit your alcohol intake.

## 2024-05-21 DIAGNOSIS — J96.01 ACUTE RESPIRATORY FAILURE WITH HYPOXIA: ICD-10-CM

## 2024-05-21 DIAGNOSIS — E78.5 HYPERLIPIDEMIA, UNSPECIFIED: ICD-10-CM

## 2024-05-21 DIAGNOSIS — D63.1 ANEMIA IN CHRONIC KIDNEY DISEASE: ICD-10-CM

## 2024-05-21 DIAGNOSIS — N18.30 CHRONIC KIDNEY DISEASE, STAGE 3 UNSPECIFIED: ICD-10-CM

## 2024-05-21 DIAGNOSIS — I48.0 PAROXYSMAL ATRIAL FIBRILLATION: ICD-10-CM

## 2024-05-21 DIAGNOSIS — T36.95XA ADVERSE EFFECT OF UNSPECIFIED SYSTEMIC ANTIBIOTIC, INITIAL ENCOUNTER: ICD-10-CM

## 2024-05-21 DIAGNOSIS — Z79.01 LONG TERM (CURRENT) USE OF ANTICOAGULANTS: ICD-10-CM

## 2024-05-21 DIAGNOSIS — Z85.46 PERSONAL HISTORY OF MALIGNANT NEOPLASM OF PROSTATE: ICD-10-CM

## 2024-05-21 DIAGNOSIS — A41.9 SEPSIS, UNSPECIFIED ORGANISM: ICD-10-CM

## 2024-05-21 DIAGNOSIS — K52.1 TOXIC GASTROENTERITIS AND COLITIS: ICD-10-CM

## 2024-05-21 DIAGNOSIS — J18.9 PNEUMONIA, UNSPECIFIED ORGANISM: ICD-10-CM

## 2024-05-21 DIAGNOSIS — I13.10 HYPERTENSIVE HEART AND CHRONIC KIDNEY DISEASE WITHOUT HEART FAILURE, WITH STAGE 1 THROUGH STAGE 4 CHRONIC KIDNEY DISEASE, OR UNSPECIFIED CHRONIC KIDNEY DISEASE: ICD-10-CM

## 2024-05-21 DIAGNOSIS — Z79.899 OTHER LONG TERM (CURRENT) DRUG THERAPY: ICD-10-CM

## 2024-05-21 DIAGNOSIS — N40.0 BENIGN PROSTATIC HYPERPLASIA WITHOUT LOWER URINARY TRACT SYMPTOMS: ICD-10-CM

## 2024-05-21 DIAGNOSIS — I45.10 UNSPECIFIED RIGHT BUNDLE-BRANCH BLOCK: ICD-10-CM
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
## 2024-07-11 NOTE — PATIENT PROFILE ADULT - BRADEN MOISTURE
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  Chest x-ray showed cardiomegaly with a pulm vascular congestion. Pro BNP levels was 755. She was in altered mental state on admission, was following commands but not oriented and was admitted in intermediate ICU for management of COPD and acute on chronic respiratory failure.     During hospital admission here her urinalysis came back positive for UTI and urine culture grew Klebsiella. She has completed her 5 day course of Rocephin. Blood culture grew coagulase negative. Is currently on oral keflex for 5 days, ID on board.    She has past history of DVT and is on coumadin. INR is subtherapeutic 1.6. Currently bridging with lovenox. Target INR 2.5-3.5    She is back to 4L and maintaining saturation which is her baseline and uses bipap at night. She is okay to go to nursing facility. Waiting for placement. Will need to get sleep study on discharge and realizes she needs to use Bipap every night.    (4) rarely moist

## 2025-05-08 ENCOUNTER — INPATIENT (INPATIENT)
Facility: HOSPITAL | Age: 85
LOS: 0 days | Discharge: HOME HEALTH | DRG: 641 | End: 2025-05-09
Attending: INTERNAL MEDICINE | Admitting: STUDENT IN AN ORGANIZED HEALTH CARE EDUCATION/TRAINING PROGRAM
Payer: MEDICARE

## 2025-05-08 VITALS
RESPIRATION RATE: 18 BRPM | OXYGEN SATURATION: 97 % | DIASTOLIC BLOOD PRESSURE: 71 MMHG | HEART RATE: 83 BPM | TEMPERATURE: 98 F | SYSTOLIC BLOOD PRESSURE: 164 MMHG

## 2025-05-08 DIAGNOSIS — Z98.49 CATARACT EXTRACTION STATUS, UNSPECIFIED EYE: Chronic | ICD-10-CM

## 2025-05-08 DIAGNOSIS — R55 SYNCOPE AND COLLAPSE: ICD-10-CM

## 2025-05-08 LAB
ALBUMIN SERPL ELPH-MCNC: 4.5 G/DL — SIGNIFICANT CHANGE UP (ref 3.5–5.2)
ALP SERPL-CCNC: 52 U/L — SIGNIFICANT CHANGE UP (ref 30–115)
ALT FLD-CCNC: 14 U/L — SIGNIFICANT CHANGE UP (ref 0–41)
ANION GAP SERPL CALC-SCNC: 13 MMOL/L — SIGNIFICANT CHANGE UP (ref 7–14)
APPEARANCE UR: CLEAR — SIGNIFICANT CHANGE UP
AST SERPL-CCNC: 21 U/L — SIGNIFICANT CHANGE UP (ref 0–41)
BASOPHILS # BLD AUTO: 0.02 K/UL — SIGNIFICANT CHANGE UP (ref 0–0.2)
BASOPHILS NFR BLD AUTO: 0.4 % — SIGNIFICANT CHANGE UP (ref 0–1)
BILIRUB SERPL-MCNC: 1 MG/DL — SIGNIFICANT CHANGE UP (ref 0.2–1.2)
BILIRUB UR-MCNC: NEGATIVE — SIGNIFICANT CHANGE UP
BUN SERPL-MCNC: 23 MG/DL — HIGH (ref 10–20)
CALCIUM SERPL-MCNC: 10.7 MG/DL — HIGH (ref 8.4–10.5)
CHLORIDE SERPL-SCNC: 105 MMOL/L — SIGNIFICANT CHANGE UP (ref 98–110)
CO2 SERPL-SCNC: 21 MMOL/L — SIGNIFICANT CHANGE UP (ref 17–32)
COLOR SPEC: YELLOW — SIGNIFICANT CHANGE UP
CREAT SERPL-MCNC: 1.4 MG/DL — SIGNIFICANT CHANGE UP (ref 0.7–1.5)
DIFF PNL FLD: NEGATIVE — SIGNIFICANT CHANGE UP
EGFR: 49 ML/MIN/1.73M2 — LOW
EGFR: 49 ML/MIN/1.73M2 — LOW
EOSINOPHIL # BLD AUTO: 0.12 K/UL — SIGNIFICANT CHANGE UP (ref 0–0.7)
EOSINOPHIL NFR BLD AUTO: 2.1 % — SIGNIFICANT CHANGE UP (ref 0–8)
GLUCOSE SERPL-MCNC: 100 MG/DL — HIGH (ref 70–99)
GLUCOSE UR QL: NEGATIVE MG/DL — SIGNIFICANT CHANGE UP
HCT VFR BLD CALC: 45 % — SIGNIFICANT CHANGE UP (ref 42–52)
HGB BLD-MCNC: 15.1 G/DL — SIGNIFICANT CHANGE UP (ref 14–18)
IMM GRANULOCYTES NFR BLD AUTO: 0.9 % — HIGH (ref 0.1–0.3)
KETONES UR-MCNC: NEGATIVE MG/DL — SIGNIFICANT CHANGE UP
LEUKOCYTE ESTERASE UR-ACNC: NEGATIVE — SIGNIFICANT CHANGE UP
LYMPHOCYTES # BLD AUTO: 1.03 K/UL — LOW (ref 1.2–3.4)
LYMPHOCYTES # BLD AUTO: 18.1 % — LOW (ref 20.5–51.1)
MAGNESIUM SERPL-MCNC: 2.1 MG/DL — SIGNIFICANT CHANGE UP (ref 1.8–2.4)
MCHC RBC-ENTMCNC: 30.1 PG — SIGNIFICANT CHANGE UP (ref 27–31)
MCHC RBC-ENTMCNC: 33.6 G/DL — SIGNIFICANT CHANGE UP (ref 32–37)
MCV RBC AUTO: 89.8 FL — SIGNIFICANT CHANGE UP (ref 80–94)
MONOCYTES # BLD AUTO: 0.5 K/UL — SIGNIFICANT CHANGE UP (ref 0.1–0.6)
MONOCYTES NFR BLD AUTO: 8.8 % — SIGNIFICANT CHANGE UP (ref 1.7–9.3)
NEUTROPHILS # BLD AUTO: 3.98 K/UL — SIGNIFICANT CHANGE UP (ref 1.4–6.5)
NEUTROPHILS NFR BLD AUTO: 69.7 % — SIGNIFICANT CHANGE UP (ref 42.2–75.2)
NITRITE UR-MCNC: NEGATIVE — SIGNIFICANT CHANGE UP
NRBC BLD AUTO-RTO: 0 /100 WBCS — SIGNIFICANT CHANGE UP (ref 0–0)
NT-PROBNP SERPL-SCNC: 966 PG/ML — HIGH (ref 0–300)
PH UR: 7.5 — SIGNIFICANT CHANGE UP (ref 5–8)
PLATELET # BLD AUTO: 191 K/UL — SIGNIFICANT CHANGE UP (ref 130–400)
PMV BLD: 9.1 FL — SIGNIFICANT CHANGE UP (ref 7.4–10.4)
POTASSIUM SERPL-MCNC: 4.9 MMOL/L — SIGNIFICANT CHANGE UP (ref 3.5–5)
POTASSIUM SERPL-SCNC: 4.9 MMOL/L — SIGNIFICANT CHANGE UP (ref 3.5–5)
PROT SERPL-MCNC: 7.1 G/DL — SIGNIFICANT CHANGE UP (ref 6–8)
PROT UR-MCNC: NEGATIVE MG/DL — SIGNIFICANT CHANGE UP
RBC # BLD: 5.01 M/UL — SIGNIFICANT CHANGE UP (ref 4.7–6.1)
RBC # FLD: 13.1 % — SIGNIFICANT CHANGE UP (ref 11.5–14.5)
SODIUM SERPL-SCNC: 139 MMOL/L — SIGNIFICANT CHANGE UP (ref 135–146)
SP GR SPEC: 1.02 — SIGNIFICANT CHANGE UP (ref 1–1.03)
TROPONIN T, HIGH SENSITIVITY RESULT: 15 NG/L — SIGNIFICANT CHANGE UP (ref 6–21)
TROPONIN T, HIGH SENSITIVITY RESULT: 18 NG/L — SIGNIFICANT CHANGE UP (ref 6–21)
UROBILINOGEN FLD QL: 0.2 MG/DL — SIGNIFICANT CHANGE UP (ref 0.2–1)
WBC # BLD: 5.7 K/UL — SIGNIFICANT CHANGE UP (ref 4.8–10.8)
WBC # FLD AUTO: 5.7 K/UL — SIGNIFICANT CHANGE UP (ref 4.8–10.8)

## 2025-05-08 PROCEDURE — 73562 X-RAY EXAM OF KNEE 3: CPT | Mod: 26,LT

## 2025-05-08 PROCEDURE — 36415 COLL VENOUS BLD VENIPUNCTURE: CPT

## 2025-05-08 PROCEDURE — 83735 ASSAY OF MAGNESIUM: CPT

## 2025-05-08 PROCEDURE — 93010 ELECTROCARDIOGRAM REPORT: CPT | Mod: 76

## 2025-05-08 PROCEDURE — 73562 X-RAY EXAM OF KNEE 3: CPT | Mod: LT

## 2025-05-08 PROCEDURE — 70450 CT HEAD/BRAIN W/O DYE: CPT | Mod: 26

## 2025-05-08 PROCEDURE — 80048 BASIC METABOLIC PNL TOTAL CA: CPT

## 2025-05-08 PROCEDURE — 73502 X-RAY EXAM HIP UNI 2-3 VIEWS: CPT | Mod: 26,LT

## 2025-05-08 PROCEDURE — 99222 1ST HOSP IP/OBS MODERATE 55: CPT

## 2025-05-08 PROCEDURE — 93970 EXTREMITY STUDY: CPT | Mod: 26

## 2025-05-08 PROCEDURE — 99285 EMERGENCY DEPT VISIT HI MDM: CPT

## 2025-05-08 PROCEDURE — 85025 COMPLETE CBC W/AUTO DIFF WBC: CPT

## 2025-05-08 PROCEDURE — 97162 PT EVAL MOD COMPLEX 30 MIN: CPT | Mod: GP

## 2025-05-08 PROCEDURE — 73502 X-RAY EXAM HIP UNI 2-3 VIEWS: CPT | Mod: LT

## 2025-05-08 RX ORDER — TAMSULOSIN HYDROCHLORIDE 0.4 MG/1
0.4 CAPSULE ORAL AT BEDTIME
Refills: 0 | Status: DISCONTINUED | OUTPATIENT
Start: 2025-05-08 | End: 2025-05-09

## 2025-05-08 RX ORDER — APIXABAN 2.5 MG/1
5 TABLET, FILM COATED ORAL
Refills: 0 | Status: DISCONTINUED | OUTPATIENT
Start: 2025-05-08 | End: 2025-05-09

## 2025-05-08 RX ORDER — METOPROLOL SUCCINATE 50 MG/1
25 TABLET, EXTENDED RELEASE ORAL
Refills: 0 | Status: DISCONTINUED | OUTPATIENT
Start: 2025-05-08 | End: 2025-05-09

## 2025-05-08 RX ORDER — ATORVASTATIN CALCIUM 80 MG/1
20 TABLET, FILM COATED ORAL AT BEDTIME
Refills: 0 | Status: DISCONTINUED | OUTPATIENT
Start: 2025-05-08 | End: 2025-05-09

## 2025-05-08 RX ORDER — SODIUM CHLORIDE 9 G/1000ML
1000 INJECTION, SOLUTION INTRAVENOUS
Refills: 0 | Status: DISCONTINUED | OUTPATIENT
Start: 2025-05-08 | End: 2025-05-09

## 2025-05-08 RX ADMIN — METOPROLOL SUCCINATE 25 MILLIGRAM(S): 50 TABLET, EXTENDED RELEASE ORAL at 22:16

## 2025-05-08 RX ADMIN — Medication 100 MILLILITER(S): at 11:01

## 2025-05-08 RX ADMIN — SODIUM CHLORIDE 60 MILLILITER(S): 9 INJECTION, SOLUTION INTRAVENOUS at 20:31

## 2025-05-08 RX ADMIN — TAMSULOSIN HYDROCHLORIDE 0.4 MILLIGRAM(S): 0.4 CAPSULE ORAL at 21:34

## 2025-05-08 RX ADMIN — APIXABAN 5 MILLIGRAM(S): 2.5 TABLET, FILM COATED ORAL at 22:16

## 2025-05-08 RX ADMIN — ATORVASTATIN CALCIUM 20 MILLIGRAM(S): 80 TABLET, FILM COATED ORAL at 21:34

## 2025-05-08 NOTE — H&P ADULT - ATTENDING COMMENTS
82 yo M  with hx of Prostate cancer s/p seed procedure, Gill Anglin on eliquis , HTN/ HLD presents to ED   complaining of dizziness when he got up out of bed and states that he almost passed out, also c/o left hip pain radiates to L knee.    # pre syncope  # Mild RAFAEL  # Mild Hyper ca  # L hip / Knee pain suspected OA  # HTN/ HLD 84 yo M  with hx of Prostate cancer s/p seed procedure, Gill Anglin on eliquis , HTN/ HLD presents to ED   complaining of dizziness when he got up out of bed and states that he almost passed out, also c/o left hip pain radiates to L knee.    # pre syncope  # Mild RAFAEL  # Mild Hyper ca  # Chronic Afib   # L hip / Knee pain suspected OA  # HTN/ HLD    - suspected vasovagal pre syncope from dehydration with midl RAFAEL and Mild Hyper Ca   - orthostatic BP neg in ER   - get left hip/ knee Xray  - PT eval   - pain control, tylenol 650mg q6h standing, gabapentin 300mg bid, tramadol 25mg q8h PRN  - home meds  - DNR/ intubate 82 yo M  with hx of Prostate cancer s/p seed procedure, Gill Anglin on eliquis , HTN/ HLD presents to ED   complaining of dizziness when he got up out of bed and states that he almost passed out, also c/o left hip pain radiates to L knee.    # pre syncope  # Mild RAFAEL  # Mild Hyper ca  # Chronic Afib   # L hip / Knee pain suspected OA  # HTN/ HLD    - suspected vasovagal pre syncope from dehydration with midl RAFAEL and Mild Hyper Ca   - orthostatic BP neg in ER   - get left hip/ knee Xray  - PT eval   - pain control, tylenol 650mg q6h standing, gabapentin 300mg bid, tramadol 25mg q8h PRN  - home meds  - DNR/ intubate  - this reflects eval on 5/8

## 2025-05-08 NOTE — ED PROVIDER NOTE - PHYSICAL EXAMINATION
General: Patient is A&O x 3 in NAD  Head: NC/AT  Eyes: EOMI, no lid lag, no proptosis  Ears: Normal  Nose: Normal  Neck: Normal ROM  Lungs: Normal respiratory effort, CTA bilaterally  Heart: IRRR  Neuro: Motor/sensory grossly intact, antalgic gait  Musculoskeletal:  (+) Left calf tenderness, with ROM of left leg he does experience pain at the hip  Skin: No rash, lesions or ulcers  Psych: Normal affect and behavior, intact judgement and insight

## 2025-05-08 NOTE — ED PROVIDER NOTE - OBJECTIVE STATEMENT
Patient is an 84-year-old male with a past medical history of rheumatoid arthritis, atrial fibrillation on Eliquis, hypercholesterolemia, hypertension, hard of hearing, and prostate cancer.  The patient is complaining of dizziness when he got up out of bed this morning and states that he almost passed out.  He has never experienced dizziness before.  He is not dizzy now.  He told his son that the room was spinning.  The son says that he is also a little off this morning and not as sharp as his usual self.  His doctor recently told him to lose weight and yesterday his last meal was lunch.  The son wonders if this is playing a role.  He is also complaining of left leg pain for about 2 weeks.  It goes from his hip to his knee radiating up and down sometimes.  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  The x-rays also showed degenerative changes at pubic symphysis.  Also showed degenerative disc disease in lower lumbar spine.  It also showed some vascular calcifications.  He did not have any fall.  He has no prior history of dizziness.  No chest pain.    PMH:  rheumatoid arthritis, atrial fibrillation on Eliquis, hypercholesterolemia, hypertension, hard of hearing, and prostate cancer  Social history: He does not smoke, drink, or use drugs  Allergies: Clindamycin Patient is an 84-year-old male with a past medical history of rheumatoid arthritis, atrial fibrillation on Eliquis, hypercholesterolemia, hypertension, hard of hearing, and prostate cancer.  The patient is complaining of dizziness when he got up out of bed this morning and states that he almost passed out.  He has never experienced dizziness before.  He is not dizzy now.  He told his son that the room was spinning.  The son says that he is also a little off this morning and not as sharp as his usual self.  His doctor recently told him to lose weight and yesterday his last meal was lunch.  The son wonders if this is playing a role.  He is also complaining of left leg pain for about 2 weeks.  It goes from his hip to his knee radiating up and down sometimes.  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  The x-rays also showed degenerative changes at pubic symphysis.  Also showed degenerative disc disease in lower lumbar spine.  It also showed some vascular calcifications.  He did not have any fall.  He has no prior history of dizziness.  He was recently started on a muscle relaxer. No chest pain.  His markers have been positive for multiple myeloma but work ups have been unremarkable.    PMH:  rheumatoid arthritis, atrial fibrillation on Eliquis, hypercholesterolemia, hypertension, hard of hearing, and prostate cancer  Social history: He does not smoke, drink, or use drugs  Allergies: Clindamycin Patient is an 84-year-old male with a past medical history of rheumatoid arthritis, atrial fibrillation on Eliquis, hypercholesterolemia, hypertension, hard of hearing, and prostate cancer.  The patient is complaining of dizziness when he got up out of bed this morning and states that he almost passed out.  He has never experienced dizziness before.  He is not dizzy now.  He told his son that the room was spinning.  The son says that he is also a little off this morning and not as sharp as his usual self.  His doctor recently told him to lose weight and yesterday his last meal was lunch.  The son wonders if this is playing a role.  He is also complaining of left leg pain for about 2 weeks.  It goes from his hip to his knee radiating up and down sometimes.  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  The x-rays also showed degenerative changes at pubic symphysis.  Also showed degenerative disc disease in lower lumbar spine.  It also showed some vascular calcifications.  He did not have any fall.  He has no prior history of dizziness.  He was recently started on a muscle relaxer. No chest pain.  His markers have been positive for multiple myeloma but work ups have been unremarkable.    PMH:  rheumatoid arthritis, atrial fibrillation on Eliquis, hypercholesterolemia, hypertension, hard of hearing, and prostate cancer  Social history: He does not smoke, drink, or use drugs  Allergies: Clindamycin  PCP:  Dr. Regan

## 2025-05-08 NOTE — ED PROVIDER NOTE - PRIOR OUTPATIENT RADIOLOGY SUMMARY
left hip xrays within last week: showed no fracture but did show mild to moderate osteoarthritis at hip joints bilaterally with joint space narrowing and small marginal osteophytes.  The x-rays also showed degenerative changes at pubic symphysis.  Also showed degenerative disc disease in lower lumbar spine.  It also showed some vascular calcifications.

## 2025-05-08 NOTE — H&P ADULT - CONVERSATION DETAILS
dw the patient and his brother the GOCs focused on code status, both agreed on not getting resuscitation but ok for trial of intubation but not to live on mechanical ventilation for prolong time.   DNR/Intubate

## 2025-05-08 NOTE — ED PROVIDER NOTE - CARE PLAN
1 Principal Discharge DX:	Near syncope  Secondary Diagnosis:	Left hip pain  Secondary Diagnosis:	Gait instability  Secondary Diagnosis:	Dehydration  Secondary Diagnosis:	Atrial fibrillation

## 2025-05-08 NOTE — ED PROVIDER NOTE - CLINICAL SUMMARY MEDICAL DECISION MAKING FREE TEXT BOX
Pt is an 84 yr old male who presents with dizziness this AM (near syncope when waking up and getting out of bed) and 2 wks left leg pain  Physical exam reveals IRRR on heart exam, clear lung, pain left help and left calf tenderness. No trauma.  Had recent xrays (I reviewed the xray portal the son showed me).  There is a broad differential  Will get CT brain, duplex doppler left leg, check labs, check accuchek, gentle hydration Pt is an 84 yr old male who presents with dizziness this AM (near syncope when waking up and getting out of bed) and 2 wks left leg pain  Physical exam reveals IRRR on heart exam, clear lung, pain left help and left calf tenderness. No trauma.  Had recent xrays (I reviewed the xray portal the son showed me).  There is a broad differential  Obtained CT brain, duplex doppler left leg, labs, accuchek.  I hydrated the patient  I admitted the pt for Near Syncope and leg pain and dehydration  Can be monitored on the floor  Can have PT evaluation

## 2025-05-08 NOTE — H&P ADULT - ASSESSMENT
84 yo M  with hx of Prostate cancer s/p seed procedure, Parox A.fib on eliquis , HTN/ HLD presents to ED complaining of dizziness when he got up out of bed and states that he almost passed out, also c/o left hip pain radiates to L knee.    # pre syncope likely vasovagal  # Mild RAFAEL  # Mild Hyper ca  - HD stable  - EKG reviewed  - Labs unremarkable except for mild rafael  - Check TTE  - Check orths  - Gentle hydration  - PT/OT    # L hip / Knee pain suspected OA  - Check left hip/knee xrays  - PT eval    #Paroxsymal Atrial Fibrillation  #Hypertension  - c/w Eliquis 2.5 BID  - c/w Lopressor 25mg PO BID and Amlodipine 5mg    #Hyperlipidemia  - c/w Atorvastatin 20mg QD and Fenofibrate 145mg PO QD    #Prostate Cancer s/p Seeding Therapy  #BPH  - c/w Tamsulosin  - out pt f/u     #Misc  - DVT Prophylaxis: Eliquis  - GI Prophylaxis: None  - Diet: DASH/CC  - Activity: IAT  - Code Status: DNR/ intubate   84 yo M  with hx of Prostate cancer s/p seed procedure, Parox A.fib on eliquis , HTN/ HLD presents to ED complaining of dizziness when he got up out of bed and states that he almost passed out, also c/o left hip pain radiates to L knee.    # pre syncope likely vasovagal  # Mild RAFAEL  # Mild Hyper ca  - HD stable  - EKG reviewed  - Labs unremarkable except for mild rafael  - Check orths  - Gentle hydration  - PT eval    # L hip / Knee pain suspected OA  - Check left hip/knee xrays  - Pain control: tylenol 650mg q6h standing, gabapentin 300mg bid, tramadol 25mg q8h PRN  - PT eval    #Paroxsymal Atrial Fibrillation  #Hypertension  - c/w Eliquis 5mg BID  - c/w Lopressor 25mg PO BID     #Hyperlipidemia  - c/w Atorvastatin 20mg QD and Fenofibrate 145mg PO QD    #Prostate Cancer s/p Seeding Therapy  #BPH  - c/w Tamsulosin  - out pt f/u     #Misc  - DVT Prophylaxis: Eliquis  - GI Prophylaxis: None  - Diet: DASH/CC  - Activity: IAT  - Code Status: DNR/ intubate

## 2025-05-08 NOTE — ED ADULT NURSE NOTE - NSFALLHARMRISKINTERV_ED_ALL_ED
Assistance OOB with selected safe patient handling equipment if applicable/Assistance with ambulation/Communicate risk of Fall with Harm to all staff, patient, and family/Monitor gait and stability/Provide visual cue: red socks, yellow wristband, yellow gown, etc/Reinforce activity limits and safety measures with patient and family/Bed in lowest position, wheels locked, appropriate side rails in place/Call bell, personal items and telephone in reach/Instruct patient to call for assistance before getting out of bed/chair/stretcher/Non-slip footwear applied when patient is off stretcher/[place] to call system/Physically safe environment - no spills, clutter or unnecessary equipment/Purposeful Proactive Rounding/Room/bathroom lighting operational, light cord in reach

## 2025-05-08 NOTE — ED ADULT TRIAGE NOTE - CHIEF COMPLAINT QUOTE
C/o dizziness since standing up this morning. Pt states when he stood up he almost fell but grabbed onto something. Also c/o left hip pain radiating down the leg x 2 weeks

## 2025-05-08 NOTE — H&P ADULT - NSVTERISKREFERASSESS_GEN_ALL_CORE
Refer to the Assessment tab to view/cancel completed assessment. Information: Selecting Yes will display possible errors in your note based on the variables you have selected. This validation is only offered as a suggestion for you. PLEASE NOTE THAT THE VALIDATION TEXT WILL BE REMOVED WHEN YOU FINALIZE YOUR NOTE. IF YOU WANT TO FAX A PRELIMINARY NOTE YOU WILL NEED TO TOGGLE THIS TO 'NO' IF YOU DO NOT WANT IT IN YOUR FAXED NOTE.

## 2025-05-08 NOTE — H&P ADULT - NSHPPHYSICALEXAM_GEN_ALL_CORE
CONSTITUTIONAL: No acute distress, well-developed, well-groomed, AAOx3  HEAD: Atraumatic, normocephalic  EYES: EOM intact, PERRLA, conjunctiva and sclera clear  ENT: Supple, no masses, no thyromegaly, no bruits, no JVD; moist mucous membranes  PULMONARY: Clear to auscultation bilaterally; no wheezes, rales, or rhonchi  CARDIOVASCULAR: Regular rate but irregular rhythm; no murmurs, rubs, or gallops  GASTROINTESTINAL: Soft, non-tender, non-distended; bowel sounds present  MUSCULOSKELETAL: 2+ peripheral pulses; no clubbing, no cyanosis, no edema  NEUROLOGY: non-focal  SKIN: No rashes or lesions; warm and dry

## 2025-05-08 NOTE — PATIENT PROFILE ADULT - FALL HARM RISK - HARM RISK INTERVENTIONS

## 2025-05-08 NOTE — H&P ADULT - HISTORY OF PRESENT ILLNESS
An 84-year-old male with a past medical history of rheumatoid arthritis, atrial fibrillation on Eliquis, hypercholesterolemia, hypertension, hard of hearing, and prostate cancer presented for dizziness. Patient reports dizziness when he got up out of bed this morning and states that he almost passed out. Per the son, patient felt the room is spinning, he seemed a little off this morning and not as sharp as his usual self and had intentionally decreased po intake for weight loss.  He did not have any fall.  He has no prior history of dizziness.  He was recently started on a muscle relaxer. No chest pain.  His markers have been positive for multiple myeloma but work ups have been unremarkable.  Off note, patient is also complaining of left leg pain for about 2 weeks.  It goes from his hip to his knee radiating up and down sometimes.  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  The x-rays also showed degenerative changes at pubic symphysis.  Also showed degenerative disc disease in lower lumbar spine.  It also showed some vascular calcifications.   ED Vitals wnl.  Labs unremarkable except creat 1.4, bun 23, Ca 10.7, pro   UA negative  CTH Stable mild chronic microvascular ischemic changes.  EKG Afib, no ischemic changes   An 84-year-old male with a past medical history of rheumatoid arthritis, atrial fibrillation on Eliquis, hypercholesterolemia, hypertension, hard of hearing, and prostate cancer presented for dizziness. Patient reports dizziness when he got up out of bed this morning and states that he almost passed out. Per the son, patient felt the room is spinning, he seemed a little off this morning and not as sharp as his usual self and had intentionally decreased po intake for weight loss.  He did not have any fall.  He has no prior history of dizziness.  He was recently started on a muscle relaxer. No chest pain.  His markers have been positive for multiple myeloma but work ups have been unremarkable.  Off note, patient is also complaining of left leg pain for about 2 weeks.  It goes from his hip to his knee radiating up and down sometimes.  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  The x-rays also showed degenerative changes at pubic symphysis.  Also showed degenerative disc disease in lower lumbar spine.  It also showed some vascular calcifications.   ED Vitals wnl.  Labs unremarkable except creat 1.4, bun 23, Ca 10.7, pro   UA negative  CTH Stable mild chronic microvascular ischemic changes.  EKG Afib, no ischemic changes  Patient admitted for further management

## 2025-05-08 NOTE — ED ADULT NURSE NOTE - NS ED NURSE REPORT GIVEN TO FT
Patient Education     Coronavirus Disease 2019 (COVID-19): Overview  Coronavirus disease 2019 (COVID-19) is a respiratory illness. It's caused by a new (novel) coronavirus. There are many types of coronavirus. Coronaviruses are a very common cause of colds and bronchitis. They may sometimes cause lung infection (pneumonia). Symptoms can range from mild to severe. Some people have no symptoms. These viruses are also found in some animals.   All 50 states in the [place] have reported cases of COVID-19. Many areas report \"community spread\" of COVID-19. This means the source of the illness is not known. COVID-19 is a rapidly-emerging infectious disease. This means that scientists are actively researching it. There are information updates regularly.   Public health officials are working to find the source. How the virus spreads is not yet fully understood, but it seems to spread and infect people fairly easily. Some people who have been infected in an area may not be sure how or where they were infected. The virus may be spread through droplets of fluid that a person coughs or sneezes into the air. It may be spread if you touch a surface with the virus on it, such as a handle or object, and then touch your eyes, nose, or mouth.      To help prevent spreading the infection, wash your hands often, or use an alcohol-based hand .   For the latest information, visit the CDC website at www.cdc.gov/coronavirus/2019-ncov. Or call 485-YCC-XQEP (400-840-4395).   What are the symptoms of COVID-19?  Some people have no symptoms or mild symptoms. Symptoms can also vary from person to person. As experts learn more about COVID-19, other symptoms are being reported. Symptoms may appear 2 to 14 days after contact with the virus:   · Fever or chills  · Coughing  · Trouble breathing or feeling short of breath  · Sore throat  · Stuffy or runny nose  · Headache and body aches  · Fatigue  · Nausea, vomiting, diarrhea, or abdominal  pain  · New loss of sense of smell or taste  You can check your symptoms with the CDC’s Coronavirus Self-.   What are possible complications from COVID-19?  In many cases, this virus can cause infection (pneumonia) in both lungs. In some cases, this can cause death. Certain people are at higher risk for complications. This includes older adults and people with serious chronic health conditions such as heart or lung disease, diabetes, or kidney disease. It includes people with health conditions that suppress the immune system. And it includes people taking medicines that suppress the immune system.   As experts learn more about COVID-19, other complications are being reported that may be linked to COVID-19. Rarely, some children have developed severe complications called multisystem inflammatory syndrome in children (MIS-C). MIS-C seems to be similar to [place] disease, a rare condition causing inflammation of blood vessels and body organs. It's not yet known if MIS-C happens only in children, or if adults are also at risk. It's also not known if it's related to COVID-19, because many children, but not all, have tested positive for the virus. Experts continue to study MIS-C. The CDC advises healthcare providers to report to local health departments any person under age 21 years old who is ill enough to be in the hospital and has all of the following:   · A fever over 100.4°F (38.0°C) for more than 24 hours and a positive SARS-CoV-2 test or exposure to the virus in the last 4 weeks  · Inflammation in at least 2 organs such as the heart, lungs, or kidneys with lab tests that show inflammation  · No other diagnoses besides COVID-19 explain the child's symptoms  How is COVID-19 diagnosed?  Your healthcare provider will ask about your symptoms. He or she will ask where you live, and about your recent travel, and any contact with sick people. If your healthcare provider thinks you may have COVID-19, he or she will  consider whether to test you for COVID-19. This depends on the availability of testing in your area, and how sick you are. Follow all instructions from your healthcare provider. Guidelines for testing may change as more information about the virus becomes available. Diagnostic tests are used to find a current COVID-19 infection. These include:   · Viral (molecular) test.  You may also hear this called a RT-PCR test. Viral tests are very accurate. A viral test looks for the SARS-CoV-2 virus's genetic material. There are a few ways to do this. A nose-throat swab may be wiped inside your nose to the very back of your throat. Other tests are either done by nose or throat swab. Or a sample of your saliva may be taken. Availability of tests vary by location. Some test kits can be done at home but must be sent to a lab to be checked. Depending on the test, some results are back within about 30 minutes. Some tests must be sent to a lab and can take several days before the results are back. Home test kits are now available but vary by location, and some need a prescription. Some kits get results quickly at home. Others must be sent to a lab for the results.  · Antigen test. This can find proteins from the SARS-CoV-2 virus. This is done by a nose or a nose-throat swab. Depending on the test, some results are back within an hour. Positive results are highly accurate, but false positives can happen, especially in places where few people have the virus. Antigen tests are more likely to miss a COVID-19 infection than a viral (molecular) test. If your antigen test is negative but you have symptoms of COVID-19, your healthcare provider may order a viral test.  If your healthcare provider thinks or confirms that you have COVID-19, you may have other tests. These tests may include:   · Antibody blood test.  Antibody tests are being looked at to find out if a person has previously been infected with the virus and may now have  antibodies such as SARS AB IgG in their blood to give some immunity. The accuracy and availability of antibody tests vary. An antibody test may not be able to show if you have a current infection because it can take up to a few weeks after infection to make antibodies. It's not yet known how long immunity lasts after being infected with the virus.  · Sputum culture.  A small sample of mucus coughed from your lungs (sputum) may be collected if you have a moist cough. It may be checked for the virus or to look for pneumonia.  · Imaging tests.  You may have a chest X-ray or CT scan.  Note about reinfection and your immunity  At this time, it's unclear if people can be reinfected with COVID-19. The CDC notes that if a person has fully recovered from COVID-19 and is retested within 3 months of the first infection, they may continue to have low levels of the virus in their body and test positive for COVID-19, even though they are not spreading COVID-19. Having a positive COVID-19 test after an infection doesn't mean you can't be reinfected. It's not yet known how long immunity lasts after being infected with the virus.   How is COVID-19 treated?  There is currently no vaccine or medicine approved to prevent COVID-19.   The most proven treatments right now are those to help your body while it fights the virus. This is known as supportive care. For serious COVID-19, you may need to stay in the hospital. Supportive care may include:   · Getting rest.  This helps your body fight the illness.  · Staying hydrated.  Drinking liquids is the best way to prevent dehydration. Try to drink 6 to 8 glasses of liquids every day, or as advised by your provider. Also check with your provider about which fluids are best for you. Don't drink fluids that contain caffeine or alcohol.  · Taking over-the-counter (OTC) pain medicine.  These are used to help ease pain and reduce fever. Follow your healthcare provider's instructions for which OTC  medicine to use.  For severe illness, you may need to stay in the hospital. Care during severe illness may include:   · IV (intravenous) fluids.  These are given through a vein to help keep your body hydrated.  · Oxygen. You may be given supplemental oxygen or ventilation with a breathing machine (ventilator). This is done so you get enough oxygen in your body.  · Prone positioning.  Depending on how sick you are during your hospital stay, your healthcare team may turn you regularly on your stomach. This is called prone positioning. It helps increase the amount of oxygen you get to your lungs. Follow your healthcare team's instructions on position changes while you're in the hospital. Also follow their discharge advice on the best positions to help your breathing once you go home.  · Remdesivir. The FDA has approved an IV (intravenous) antiviral medicine called remdesivir. It works by stopping the spread of the SARS-CoV-2 virus in the body. It's approved for people in the hospital. It's for people 12 years and older who weigh more than about 88 pounds (40 kgs). Remdesivir is approved only for people who need to be treated in the hospital. In certain cases, it may also be used for people younger than 12 years or who weigh less than about 88 pounds (40 kgs).  Research continues on other therapies that are still experimental. These include:   · COVID-19 convalescent plasma. People who have had COVID-19 and are fully recovered may be asked by their healthcare team to consider donating plasma. This is called COVID-19 convalescent plasma donation. Plasma from people fully recovered from COVID-19 may contain antibodies to help fight COVID-19 in people who are currently seriously ill with the disease. Experts don't know if the donated plasma will work well as a treatment. Research continues, and the FDA has approved it for emergency use in certain people with serious or life-threatening COVID-19. Talk with your provider to  learn more about convalescent plasma donation and whether you qualify to donate.  · Bamlanivimab. The FDA recently approved this experimental therapy (monoclonal antibody therapy) for emergency use for certain people who have a positive COVID-19 viral test but are not in the hospital. It's not widely available and is still being investigated. It's approved for people 12 years and older who weigh about 88 pounds (40 kgs) and are at high risk for severe COVID-19 and a hospital stay. This includes people who are 65 years or older and people with certain chronic conditions.  Are you at risk for COVID-19?  You are at risk for COVID-19 if you have had close contact with someone with the virus, or if you live in or traveled to an area with cases of it. Close contact means being within 6 feet of a person known to have COVID-19 for a total of 15 minutes or more. This could be multiple short encounters that add up to at least 15 minutes over a 24-hour period. Recent studies suggest that COVID-19 may be spread by people who are not showing symptoms.   Date last modified: 11/20/2020   [employer] last reviewed this educational content on 1/1/2020  © 1519-5755 [employer]. All rights reserved. This information is not intended as a substitute for professional medical care. Always follow your healthcare professional's instructions.            3C RN

## 2025-05-08 NOTE — H&P ADULT - NSHPLABSRESULTS_GEN_ALL_CORE
.  LABS:                         15.1   5.70  )-----------( 191      ( 08 May 2025 10:30 )             45.0     05-08    139  |  105  |  23[H]  ----------------------------<  100[H]  4.9   |  21  |  1.4    Ca    10.7[H]      08 May 2025 10:30  Mg     2.1     05-08    TPro  7.1  /  Alb  4.5  /  TBili  1.0  /  DBili  x   /  AST  21  /  ALT  14  /  AlkPhos  52  05-08      Urinalysis Basic - ( 08 May 2025 10:30 )    Color: Yellow / Appearance: Clear / S.019 / pH: x  Gluc: 100 mg/dL / Ketone: Negative mg/dL  / Bili: Negative / Urobili: 0.2 mg/dL   Blood: x / Protein: Negative mg/dL / Nitrite: Negative   Leuk Esterase: Negative / RBC: x / WBC x   Sq Epi: x / Non Sq Epi: x / Bacteria: x            RADIOLOGY, EKG & ADDITIONAL TESTS: Reviewed.

## 2025-05-09 VITALS
RESPIRATION RATE: 16 BRPM | TEMPERATURE: 98 F | OXYGEN SATURATION: 97 % | SYSTOLIC BLOOD PRESSURE: 126 MMHG | DIASTOLIC BLOOD PRESSURE: 67 MMHG | HEART RATE: 89 BPM

## 2025-05-09 LAB
ANION GAP SERPL CALC-SCNC: 12 MMOL/L — SIGNIFICANT CHANGE UP (ref 7–14)
BASOPHILS # BLD AUTO: 0.02 K/UL — SIGNIFICANT CHANGE UP (ref 0–0.2)
BASOPHILS NFR BLD AUTO: 0.4 % — SIGNIFICANT CHANGE UP (ref 0–1)
BUN SERPL-MCNC: 20 MG/DL — SIGNIFICANT CHANGE UP (ref 10–20)
CALCIUM SERPL-MCNC: 9.6 MG/DL — SIGNIFICANT CHANGE UP (ref 8.4–10.5)
CHLORIDE SERPL-SCNC: 105 MMOL/L — SIGNIFICANT CHANGE UP (ref 98–110)
CO2 SERPL-SCNC: 19 MMOL/L — SIGNIFICANT CHANGE UP (ref 17–32)
CREAT SERPL-MCNC: 1.1 MG/DL — SIGNIFICANT CHANGE UP (ref 0.7–1.5)
EGFR: 65 ML/MIN/1.73M2 — SIGNIFICANT CHANGE UP
EGFR: 65 ML/MIN/1.73M2 — SIGNIFICANT CHANGE UP
EOSINOPHIL # BLD AUTO: 0.16 K/UL — SIGNIFICANT CHANGE UP (ref 0–0.7)
EOSINOPHIL NFR BLD AUTO: 2.9 % — SIGNIFICANT CHANGE UP (ref 0–8)
GLUCOSE SERPL-MCNC: 128 MG/DL — HIGH (ref 70–99)
HCT VFR BLD CALC: 40.1 % — LOW (ref 42–52)
HGB BLD-MCNC: 13.5 G/DL — LOW (ref 14–18)
IMM GRANULOCYTES NFR BLD AUTO: 0.7 % — HIGH (ref 0.1–0.3)
LYMPHOCYTES # BLD AUTO: 0.94 K/UL — LOW (ref 1.2–3.4)
LYMPHOCYTES # BLD AUTO: 17.1 % — LOW (ref 20.5–51.1)
MAGNESIUM SERPL-MCNC: 2.3 MG/DL — SIGNIFICANT CHANGE UP (ref 1.8–2.4)
MCHC RBC-ENTMCNC: 30.3 PG — SIGNIFICANT CHANGE UP (ref 27–31)
MCHC RBC-ENTMCNC: 33.7 G/DL — SIGNIFICANT CHANGE UP (ref 32–37)
MCV RBC AUTO: 89.9 FL — SIGNIFICANT CHANGE UP (ref 80–94)
MONOCYTES # BLD AUTO: 0.61 K/UL — HIGH (ref 0.1–0.6)
MONOCYTES NFR BLD AUTO: 11.1 % — HIGH (ref 1.7–9.3)
NEUTROPHILS # BLD AUTO: 3.72 K/UL — SIGNIFICANT CHANGE UP (ref 1.4–6.5)
NEUTROPHILS NFR BLD AUTO: 67.8 % — SIGNIFICANT CHANGE UP (ref 42.2–75.2)
NRBC BLD AUTO-RTO: 0 /100 WBCS — SIGNIFICANT CHANGE UP (ref 0–0)
PLATELET # BLD AUTO: 175 K/UL — SIGNIFICANT CHANGE UP (ref 130–400)
PMV BLD: 9 FL — SIGNIFICANT CHANGE UP (ref 7.4–10.4)
POTASSIUM SERPL-MCNC: 4.2 MMOL/L — SIGNIFICANT CHANGE UP (ref 3.5–5)
POTASSIUM SERPL-SCNC: 4.2 MMOL/L — SIGNIFICANT CHANGE UP (ref 3.5–5)
RBC # BLD: 4.46 M/UL — LOW (ref 4.7–6.1)
RBC # FLD: 13.2 % — SIGNIFICANT CHANGE UP (ref 11.5–14.5)
SODIUM SERPL-SCNC: 136 MMOL/L — SIGNIFICANT CHANGE UP (ref 135–146)
WBC # BLD: 5.49 K/UL — SIGNIFICANT CHANGE UP (ref 4.8–10.8)
WBC # FLD AUTO: 5.49 K/UL — SIGNIFICANT CHANGE UP (ref 4.8–10.8)

## 2025-05-09 PROCEDURE — 99239 HOSP IP/OBS DSCHRG MGMT >30: CPT

## 2025-05-09 RX ORDER — ACETAMINOPHEN 500 MG/5ML
650 LIQUID (ML) ORAL EVERY 6 HOURS
Refills: 0 | Status: DISCONTINUED | OUTPATIENT
Start: 2025-05-09 | End: 2025-05-09

## 2025-05-09 RX ORDER — ACETAMINOPHEN 500 MG/5ML
2 LIQUID (ML) ORAL
Qty: 0 | Refills: 0 | DISCHARGE
Start: 2025-05-09

## 2025-05-09 RX ORDER — TIZANIDINE 4 MG/1
2 TABLET ORAL
Refills: 0 | DISCHARGE

## 2025-05-09 RX ORDER — AMLODIPINE BESYLATE 10 MG/1
1 TABLET ORAL
Refills: 0 | DISCHARGE

## 2025-05-09 RX ORDER — LIDOCAINE HYDROCHLORIDE 20 MG/ML
1 JELLY TOPICAL ONCE
Refills: 0 | Status: COMPLETED | OUTPATIENT
Start: 2025-05-09 | End: 2025-05-09

## 2025-05-09 RX ADMIN — LIDOCAINE HYDROCHLORIDE 1 PATCH: 20 JELLY TOPICAL at 06:08

## 2025-05-09 RX ADMIN — METOPROLOL SUCCINATE 25 MILLIGRAM(S): 50 TABLET, EXTENDED RELEASE ORAL at 10:42

## 2025-05-09 RX ADMIN — APIXABAN 5 MILLIGRAM(S): 2.5 TABLET, FILM COATED ORAL at 05:49

## 2025-05-09 RX ADMIN — Medication 650 MILLIGRAM(S): at 06:08

## 2025-05-09 NOTE — DISCHARGE NOTE NURSING/CASE MANAGEMENT/SOCIAL WORK - FINANCIAL ASSISTANCE
[facility] provides services at a reduced cost to those who are determined to be eligible through [facility]’s financial assistance program. Information regarding [facility]’s financial assistance program can be found by going to [email or web address] or by calling [phone].

## 2025-05-09 NOTE — DISCHARGE NOTE PROVIDER - CARE PROVIDER_API CALL
Jerri Peterson  Internal Medicine  [address] [place] [id number]  Phone: [phone]  Fax: [phone]  Established Patient  Follow Up Time: 2 weeks

## 2025-05-09 NOTE — DISCHARGE NOTE PROVIDER - HOSPITAL COURSE
84-year-old male with a past medical history of rheumatoid arthritis, atrial fibrillation on Eliquis, hypercholesterolemia, hypertension, hard of hearing, and prostate cancer presented for dizziness. Patient reports dizziness when he got up out of bed this morning and states that he almost passed out. Per the son, patient felt the room is spinning, he seemed a little off this morning and not as sharp as his usual self and had intentionally decreased po intake for weight loss.  He did not have any fall.  He has no prior history of dizziness.  He was recently started on a muscle relaxer. No chest pain.  His markers have been positive for multiple myeloma but work ups have been unremarkable.  Off note, patient is also complaining of left leg pain for about 2 weeks.  It goes from his hip to his knee radiating up and down sometimes.  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  The x-rays also showed degenerative changes at pubic symphysis.  Also showed degenerative disc disease in lower lumbar spine.  It also showed some vascular calcifications.   In the ED, vitals are within normal limits. Labs were unremarkable except creat 1.4, bun 23, Ca 10.7, pro . UA negative. CT Head showed stable mild chronic microvascular ischemic changes. EKG showed Afib, no ischemic changes. Patient admitted for further workup for dizziness.    Patient was seen for the following conditions:    # Dizziness/pre syncope likely vasovagal  # Mild RAFAEL  # Mild Hyper ca  - Hemodynamically stable  - EKG 5/9: Atrial fibrillation; repeat EKG showed atrial fibrillation with premature ventricular or aberrantly conducted  complexes.  - Labs unremarkable except for mild rafael  - Check orths  - Gentle hydration  - PT eval    # L hip / Knee pain suspected OA  - Check left hip/knee xrays  - Pain control: tylenol 650mg q6h standing, gabapentin 300mg bid, tramadol 25mg q8h PRN  - PT eval    #Paroxsymal Atrial Fibrillation  #Hypertension  - c/w Eliquis 5mg BID  - c/w Lopressor 25mg PO BID     #Hyperlipidemia  - c/w Atorvastatin 20mg QD and Fenofibrate 145mg PO QD    #Prostate Cancer s/p Seeding Therapy  #BPH  - c/w Tamsulosin  - out pt f/u        84-year-old male with a past medical history of rheumatoid arthritis, atrial fibrillation on Eliquis, hypercholesterolemia, hypertension, hard of hearing, and prostate cancer presented for dizziness. Patient reports dizziness when he got up out of bed this morning and states that he almost passed out. Per the son, patient felt the room is spinning, he seemed a little off this morning and not as sharp as his usual self and had intentionally decreased po intake for weight loss.  He did not have any fall.  He has no prior history of dizziness.  He was recently started on a muscle relaxer. No chest pain.  His markers have been positive for multiple myeloma but work ups have been unremarkable.  Off note, patient is also complaining of left leg pain for about 2 weeks.  It goes from his hip to his knee radiating up and down sometimes.  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  The x-rays also showed degenerative changes at pubic symphysis.  Also showed degenerative disc disease in lower lumbar spine.  It also showed some vascular calcifications.   In the ED, vitals are within normal limits. Labs were unremarkable except creat 1.4, bun 23, Ca 10.7, pro . UA negative. CT Head showed stable mild chronic microvascular ischemic changes. EKG showed Afib, no ischemic changes. Patient admitted for further workup for dizziness.    Patient was seen for the following conditions:    # Dizziness/pre syncope likely vasovagal- resolved  # Mild RAFAEL- resolved  # Mild Hyper ca  - Hemodynamically stable  - EKG 5/9: Atrial fibrillation; repeat EKG showed atrial fibrillation with premature ventricular or aberrantly conducted  complexes.  - Labs unremarkable except Creat 1.4, BUN 23, Ca 10.7  - S/p Gentle hydration  - PT eval 5/9: Home PT  - Please follow up with your primary care doctor.    # L hip / Knee pain suspected OA  - Check left hip/knee xrays  - Pain control while inpatient: tylenol 650mg q6h standing, gabapentin 300mg bid, tramadol 25mg q8h PRN  - PT eval 5/9: Home PT  - Can continue Tylenol 650 mg at home    #Paroxsymal Atrial Fibrillation  #Hypertension  - Continue Eliquis 5mg BID  - Continue Lopressor 25mg PO BID     #Hyperlipidemia  - Continue Atorvastatin 20mg QD and Fenofibrate 145mg PO QD    #Prostate Cancer s/p Seeding Therapy  #BPH  - Continue tamsulosin  - Please follow up outpatient      Patient is medically stable to be discharge. Discharge discussed and approved by Dr. Rosario.       84-year-old male with a past medical history of rheumatoid arthritis, atrial fibrillation on Eliquis, hypercholesterolemia, hypertension, hard of hearing, and prostate cancer presented for dizziness. Patient reports dizziness when he got up out of bed this morning and states that he almost passed out. Per the son, patient felt the room is spinning, he seemed a little off this morning and not as sharp as his usual self and had intentionally decreased po intake for weight loss.  He did not have any fall.  He has no prior history of dizziness.  He was recently started on a muscle relaxer. No chest pain.  His markers have been positive for multiple myeloma but work ups have been unremarkable.  Off note, patient is also complaining of left leg pain for about 2 weeks.  It goes from his hip to his knee radiating up and down sometimes.  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  The x-rays also showed degenerative changes at pubic symphysis.  Also showed degenerative disc disease in lower lumbar spine.  It also showed some vascular calcifications.   In the ED, vitals are within normal limits. Labs were unremarkable except creat 1.4, bun 23, Ca 10.7, pro . UA negative. CT Head showed stable mild chronic microvascular ischemic changes. EKG showed Afib, no ischemic changes. Patient admitted for further workup for dizziness.    Patient was seen for the following conditions:    # Dizziness/pre syncope likely vasovagal- resolved  # Mild RAFAEL- resolved  # Mild Hyper ca  - Hemodynamically stable  - EKG 5/9: Atrial fibrillation; repeat EKG showed atrial fibrillation with premature ventricular or aberrantly conducted  complexes.  - Labs unremarkable except Creat 1.4, BUN 23, Ca 10.7  - S/p Gentle hydration  - PT eval 5/9: Home PT  - Please follow up with your primary care doctor.    # L hip / Knee pain suspected OA  - Check left hip/knee xrays  - Pain control while inpatient: tylenol 650mg q6h standing, gabapentin 300mg bid, tramadol 25mg q8h PRN  - PT eval 5/9: Home PT  - XRAY of hip and kneww; Negative for fracture or dislocation  - Can continue Tylenol 650 mg at home    #Paroxsymal Atrial Fibrillation  #Hypertension  - Continue Eliquis 5mg BID  - Continue Lopressor 25mg PO BID     #Hyperlipidemia  - Continue Atorvastatin 20mg QD and Fenofibrate 145mg PO QD    #Prostate Cancer s/p Seeding Therapy  #BPH  - Continue tamsulosin  - Please follow up outpatient      Patient is medically stable to be discharge. Discharge discussed and approved by Dr. Rosario.       84-year-old male with a past medical history of rheumatoid arthritis, atrial fibrillation on Eliquis, hypercholesterolemia, hypertension, hard of hearing, and prostate cancer presented for dizziness. Patient reports dizziness when he got up out of bed this morning and states that he almost passed out. Per the son, patient felt the room is spinning, he seemed a little off this morning and not as sharp as his usual self and had intentionally decreased po intake for weight loss.  He did not have any fall.  He has no prior history of dizziness.  He was recently started on a muscle relaxer. No chest pain.  His markers have been positive for multiple myeloma but work ups have been unremarkable.  Off note, patient is also complaining of left leg pain for about 2 weeks.  It goes from his hip to his knee radiating up and down sometimes.  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  The x-rays also showed degenerative changes at pubic symphysis.  Also showed degenerative disc disease in lower lumbar spine.  It also showed some vascular calcifications.   In the ED, vitals are within normal limits. Labs were unremarkable except creat 1.4, bun 23, Ca 10.7, pro . UA negative. CT Head showed stable mild chronic microvascular ischemic changes. EKG showed Afib, no ischemic changes. Patient admitted for further workup for dizziness.    Patient was seen for the following conditions:    # Dizziness/pre syncope likely vasovagal vs dehydration   # Mild RAFAEL- resolved    - Hemodynamically stable  - EKG 5/9: Atrial fibrillation; repeat EKG showed atrial fibrillation with premature ventricular or aberrantly conducted  complexes.  - Labs unremarkable except Creat 1.4, BUN 23, Ca 10.7  - S/p Gentle hydration  - PT eval 5/9: Home PT  - Please follow up with your primary care doctor.    # L hip / Knee pain suspected OA  - Check left hip/knee xrays  - Pain control while inpatient: tylenol 650mg q6h standing, gabapentin 300mg bid, tramadol 25mg q8h PRN  - PT eval 5/9: Home PT  - XRAY of hip and kneww; Negative for fracture or dislocation  - Can continue Tylenol 650 mg at home    #Paroxsymal Atrial Fibrillation  #Hypertension  - Continue Eliquis 5mg BID  - Continue Lopressor 25mg PO BID     #Hyperlipidemia  - Continue Atorvastatin 20mg QD and Fenofibrate 145mg PO QD    #Prostate Cancer s/p Seeding Therapy  #BPH  - Continue tamsulosin  - Please follow up outpatient      Patient is medically stable to be discharge. Discharge discussed and approved by Dr. Rosraio.

## 2025-05-09 NOTE — PROGRESS NOTE ADULT - SUBJECTIVE AND OBJECTIVE BOX
PATTY GILLILAND  84y Male    CHIEF COMPLAINT:    Patient is a 84y old  Male who presents with a chief complaint of dizziness (08 May 2025 12:27)      INTERVAL HPI/OVERNIGHT EVENTS:    Patient seen and examined.    ROS: All other systems are negative.    Vital Signs:    T(F): 97.5 (05-09-25 @ 04:54), Max: 98.2 (05-08-25 @ 08:36)  HR: 85 (05-09-25 @ 04:54) (83 - 102)  BP: 159/75 (05-09-25 @ 04:54) (134/72 - 165/82)  RR: 17 (05-08-25 @ 22:22) (17 - 18)  SpO2: 96% (05-09-25 @ 04:54) (96% - 97%)  I&O's Summary    08 May 2025 07:01  -  09 May 2025 07:00  --------------------------------------------------------  IN: 750 mL / OUT: 1740 mL / NET: -990 mL      Daily Height in cm: 180.34 (08 May 2025 16:09)    Daily   CAPILLARY BLOOD GLUCOSE      POCT Blood Glucose.: 115 mg/dL (08 May 2025 08:40)      PHYSICAL EXAM:    GENERAL:  NAD  SKIN: No rashes or lesions  HENT: Atraumatic. Normocephalic. PERRL. Moist membranes.  NECK: Supple, No JVD. No lymphadenopathy.  PULMONARY: CTA B/L. No wheezing. No rales  CVS: Normal S1, S2. Rate and Rhythm are regular. No murmurs.  ABDOMEN/GI: Soft, Nontender, Nondistended; BS present  EXTREMITIES: Peripheral pulses intact. No edema B/L LE.  NEUROLOGIC:  No motor or sensory deficit.  PSYCH: Alert & oriented x 3    Consultant(s) Notes Reviewed:  [x ] YES  [ ] NO  Care Discussed with Consultants/Other Providers [ x] YES  [ ] NO    EKG reviewed  Telemetry reviewed    LABS:                        15.1   5.70  )-----------( 191      ( 08 May 2025 10:30 )             45.0     05-08    139  |  105  |  23[H]  ----------------------------<  100[H]  4.9   |  21  |  1.4    Ca    10.7[H]      08 May 2025 10:30  Mg     2.1     05-08    TPro  7.1  /  Alb  4.5  /  TBili  1.0  /  DBili  x   /  AST  21  /  ALT  14  /  AlkPhos  52  05-08            Urinalysis with Rflx Culture (collected 08 May 2025 10:30)        RADIOLOGY & ADDITIONAL TESTS:      Imaging or report Personally Reviewed:  [ ] YES  [ ] NO    Medications:  Standing  apixaban 5 milliGRAM(s) Oral two times a day  atorvastatin 20 milliGRAM(s) Oral at bedtime  lactated ringers. 1000 milliLiter(s) IV Continuous <Continuous>  metoprolol tartrate 25 milliGRAM(s) Oral two times a day  sodium chloride 0.9%. 500 milliLiter(s) IV Continuous <Continuous>  tamsulosin 0.4 milliGRAM(s) Oral at bedtime    PRN Meds  acetaminophen     Tablet .. 650 milliGRAM(s) Oral every 6 hours PRN      Case discussed with resident    Care discussed with pt/family           PATTY GILLILAND  84y Male    CHIEF COMPLAINT:    Patient is a 84y old  Male who presents with a chief complaint of dizziness (08 May 2025 12:27)      INTERVAL HPI/OVERNIGHT EVENTS:    Patient seen and examined. Sitting in a chair. Feels good. No dizziness. No other complaint. Pt states that his R hip pain has improved.     ROS: All other systems are negative.    Vital Signs:    T(F): 97.5 (05-09-25 @ 04:54), Max: 98.2 (05-08-25 @ 08:36)  HR: 85 (05-09-25 @ 04:54) (83 - 102)  BP: 159/75 (05-09-25 @ 04:54) (134/72 - 165/82)  RR: 17 (05-08-25 @ 22:22) (17 - 18)  SpO2: 96% (05-09-25 @ 04:54) (96% - 97%)  I&O's Summary    08 May 2025 07:01  -  09 May 2025 07:00  --------------------------------------------------------  IN: 750 mL / OUT: 1740 mL / NET: -990 mL      Daily Height in cm: 180.34 (08 May 2025 16:09)    Daily   CAPILLARY BLOOD GLUCOSE      POCT Blood Glucose.: 115 mg/dL (08 May 2025 08:40)      PHYSICAL EXAM:    GENERAL:  NAD  SKIN: No rashes or lesions  HENT: Atraumatic. Normocephalic. PERRL. Moist membranes.  NECK: Supple, No JVD. No lymphadenopathy.  PULMONARY: CTA B/L. No wheezing. No rales  CVS: Normal S1, S2. Rate and Rhythm are regular. No murmurs.  ABDOMEN/GI: Soft, Nontender, Nondistended; BS present  EXTREMITIES: Peripheral pulses intact. Trace edema B/L LE.  NEUROLOGIC:  No motor or sensory deficit.  PSYCH: Alert & oriented x 3    Consultant(s) Notes Reviewed:  [x ] YES  [ ] NO  Care Discussed with Consultants/Other Providers [ x] YES  [ ] NO    EKG reviewed  Telemetry reviewed    LABS:                        15.1   5.70  )-----------( 191      ( 08 May 2025 10:30 )             45.0     05-08    139  |  105  |  23[H]  ----------------------------<  100[H]    Creatinine Trend: 1.1<--, 1.4<--  4.9   |  21  |  1.4    Ca    10.7[H]      08 May 2025 10:30  Mg     2.1     05-08    TPro  7.1  /  Alb  4.5  /  TBili  1.0  /  DBili  x   /  AST  21  /  ALT  14  /  AlkPhos  52  05-08            Urinalysis with Rflx Culture (collected 08 May 2025 10:30)        RADIOLOGY & ADDITIONAL TESTS:    < from: CT Head No Cont (05.08.25 @ 09:44) >  IMPRESSION:    No acute intracranial pathology.    Stable mild chronic microvascular ischemic changes.    < end of copied text >  < from: [facility] Duplex Lower Ext Vein Scan, Bilat (05.08.25 @ 10:13) >  IMPRESSION:  No evidence of deep venous thrombosis in either lower extremity.      < end of copied text >    Imaging or report Personally Reviewed:  [x ] YES  [ ] NO    Medications:  Standing  apixaban 5 milliGRAM(s) Oral two times a day  atorvastatin 20 milliGRAM(s) Oral at bedtime  lactated ringers. 1000 milliLiter(s) IV Continuous <Continuous>  metoprolol tartrate 25 milliGRAM(s) Oral two times a day  sodium chloride 0.9%. 500 milliLiter(s) IV Continuous <Continuous>  tamsulosin 0.4 milliGRAM(s) Oral at bedtime    PRN Meds  acetaminophen     Tablet .. 650 milliGRAM(s) Oral every 6 hours PRN      Case discussed with resident    Care discussed with pt/family

## 2025-05-09 NOTE — DISCHARGE NOTE NURSING/CASE MANAGEMENT/SOCIAL WORK - PATIENT PORTAL LINK FT
You can access the FollowMyHealth Patient Portal offered by [facility] by registering at the following website: [email or web address]. By joining [facility]’s FollowMyHealth portal, you will also be able to view your health information using other applications (apps) compatible with our system.

## 2025-05-09 NOTE — PHYSICAL THERAPY INITIAL EVALUATION ADULT - SPECIFY REASON(S)
Upon assessment pt is independent ( transfers ), ambulated 75 feet without Assistive Device with supervision assist. Pt does not require acute care PT at this time.

## 2025-05-09 NOTE — DISCHARGE NOTE PROVIDER - NSDCCPCAREPLAN_GEN_ALL_CORE_FT
PRINCIPAL DISCHARGE DIAGNOSIS  Diagnosis: Dizziness  Assessment and Plan of Treatment: You were admitted for further workup of dizziness. Your symptoms may have been contributed to dehydration. Your blood pressure was within normal limits and your symptoms resolved. Please take medications as instructed and follow up with your PCP. If symptoms worsen or reoccur, please return to your nearest emergecny department.  Dizziness can manifest as a feeling of unsteadiness or light-headedness. You may feel like you are about to faint. This condition can be caused by a number of things, including medicines, dehydration, or illness. Drink enough fluid to keep your urine clear or pale yellow. Do not drink alcohol and limit your caffeine intake. Avoid quick or sudden movements.  Rise slowly from chairs and steady yourself until you feel okay. In the morning, first sit up on the side of the bed.  SEEK IMMEDIATE MEDICAL CARE IF YOU HAVE ANY OF THE FOLLOWING SYMPTOMS: vomiting, changes in your vision or speech, weakness in your arms or legs, trouble speaking or swallowing, chest pain, abdominal pain, shortness of breath, sweating, bleeding, headache, neck pain, or fever.        SECONDARY DISCHARGE DIAGNOSES  Diagnosis: Left hip pain  Assessment and Plan of Treatment:     Diagnosis: Gait instability  Assessment and Plan of Treatment:     Diagnosis: Dehydration  Assessment and Plan of Treatment:     Diagnosis: Atrial fibrillation  Assessment and Plan of Treatment:

## 2025-05-09 NOTE — PHARMACOTHERAPY INTERVENTION NOTE - COMMENTS
Spoke to patient to confirm admission med rec, patient unsure of the doses of medications. Also states he started taking a muscle relaxer but unsure of the name of the medication. Called patient's [facility] [phone] to confirm medication list. Changes made to admission med rec: added tizanidine 4 mg po q12h (patient was taking scheduled q12h), added amlodipine 5 mg po daily, removed furosemide per patient he does not take a water pill this was last filled in 2024. Changes communicated with Dr Rosario.    Home Medications:  amLODIPine 5 mg oral tablet: 1 tab(s) orally once a day (09 May 2025 10:40)  atorvastatin 20 mg oral tablet: 1 orally once a day (09 May 2025 10:41)  Eliquis 5 mg oral tablet: 1 orally 2 times a day (09 May 2025 10:41)  fenofibric acid 135 mg oral delayed release capsule: 1 cap(s) orally once a day (09 May 2025 10:41)  Metoprolol Tartrate 25 mg oral tablet: 1 tab(s) orally 2 times a day (09 May 2025 10:41)  tamsulosin 0.4 mg oral capsule: 1 cap(s) orally once a day (09 May 2025 10:41)  tiZANidine 4 mg oral tablet: 2 tab(s) orally every 12 hours as needed for  muscle spasm (09 May 2025 10:40)

## 2025-05-09 NOTE — PHYSICAL THERAPY INITIAL EVALUATION ADULT - PERTINENT HX OF CURRENT PROBLEM, REHAB EVAL
84-year-old male with a past medical history of rheumatoid arthritis, atrial fibrillation on Eliquis, hypercholesterolemia, hypertension, hard of hearing, and prostate cancer presented for dizziness. Patient reports dizziness when he got up out of bed this morning and states that he almost passed out. Per the son, patient felt the room is spinning, he seemed a little off this morning and not as sharp as his usual self and had intentionally decreased po intake for weight loss.  He did not have any fall.  He has no prior history of dizziness.  He was recently started on a muscle relaxer. No chest pain.  His markers have been positive for multiple myeloma but work ups have been unremarkable.  Off note, patient is also complaining of left leg pain for about 2 weeks.  It goes from his hip to his knee radiating up and down sometimes.  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  The x-rays also showed degenerative changes at pubic symphysis.  Also showed degenerative disc disease in lower lumbar spine.  It also showed some vascular calcifications.   Pt. referred to PT for eval and tx.

## 2025-05-09 NOTE — PROGRESS NOTE ADULT - ASSESSMENT
An 84-year-old male with a past medical history of rheumatoid arthritis, atrial fibrillation on Eliquis, hypercholesterolemia, hypertension, hard of hearing, and prostate cancer presented for dizziness. Per the son, patient felt the room is spinning. He was recently started on a muscle relaxant.  An 84-year-old male with a past medical history of rheumatoid arthritis, atrial fibrillation on Eliquis, hypercholesterolemia, hypertension, hard of hearing, and prostate cancer presented for dizziness. Per the son, patient felt the room is spinning. He was recently started on a muscle relaxant.     RAFAEL likely prerenal / Dehydration  Dizziness likely due to dehydration and Tizanidine   RA  Chronic A-fib on Eliquis  HTN / DL  H/O Prostate cancer                 PLAN:    ·	Tele reviewed by me. A-fib rate controlled  ·	EKG on admission: A-fib 87/min (Interpreted by me)  ·	CT head noted. Stable mild chronic microvascular ischemic changes  ·	Venous doppler of LE is negative for DVT.   ·	Pt's dizziness was likely due to Tizanidine and dehydration  ·	D/C Tizanidine  ·	S/P IVF. Feels better now  ·	Cont Tylenol 650 mg po q 8h standing.   ·	Cont Eliquis and his other home meds  ·	Check official report of his L hip and knee X-ray prior to discharge.     * Med rec reviewed. Plan of care d/w the pt and his son in law on bedside. Time spent 41 minutes.

## 2025-05-09 NOTE — DISCHARGE NOTE PROVIDER - NSDCMRMEDTOKEN_GEN_ALL_CORE_FT
acetaminophen 325 mg oral tablet: 2 tab(s) orally 3 times a day  amLODIPine 5 mg oral tablet: 1 tab(s) orally once a day  atorvastatin 20 mg oral tablet: 1 orally once a day  Eliquis 5 mg oral tablet: 1 orally 2 times a day  fenofibric acid 135 mg oral delayed release capsule: 1 cap(s) orally once a day  Metoprolol Tartrate 25 mg oral tablet: 1 tab(s) orally 2 times a day  tamsulosin 0.4 mg oral capsule: 1 cap(s) orally once a day

## 2025-05-09 NOTE — PHYSICAL THERAPY INITIAL EVALUATION ADULT - GENERAL OBSERVATIONS, REHAB EVAL
[id number] pm Chart reviewed. Pt. seen out of bed in bedside chair in No apparent distress , + telemetry , IV lock , denies pain, Pt. alert and oriented X 4.

## 2025-05-13 DIAGNOSIS — T42.8X5A ADVERSE EFFECT OF ANTIPARKINSONISM DRUGS AND OTHER CENTRAL MUSCLE-TONE DEPRESSANTS, INITIAL ENCOUNTER: ICD-10-CM

## 2025-05-13 DIAGNOSIS — E78.00 PURE HYPERCHOLESTEROLEMIA, UNSPECIFIED: ICD-10-CM

## 2025-05-13 DIAGNOSIS — Z88.1 ALLERGY STATUS TO OTHER ANTIBIOTIC AGENTS: ICD-10-CM

## 2025-05-13 DIAGNOSIS — M16.12 UNILATERAL PRIMARY OSTEOARTHRITIS, LEFT HIP: ICD-10-CM

## 2025-05-13 DIAGNOSIS — N17.9 ACUTE KIDNEY FAILURE, UNSPECIFIED: ICD-10-CM

## 2025-05-13 DIAGNOSIS — E86.0 DEHYDRATION: ICD-10-CM

## 2025-05-13 DIAGNOSIS — N40.0 BENIGN PROSTATIC HYPERPLASIA WITHOUT LOWER URINARY TRACT SYMPTOMS: ICD-10-CM

## 2025-05-13 DIAGNOSIS — M17.10 UNILATERAL PRIMARY OSTEOARTHRITIS, UNSPECIFIED KNEE: ICD-10-CM

## 2025-05-13 DIAGNOSIS — I10 ESSENTIAL (PRIMARY) HYPERTENSION: ICD-10-CM

## 2025-05-13 DIAGNOSIS — I48.0 PAROXYSMAL ATRIAL FIBRILLATION: ICD-10-CM

## 2025-05-13 DIAGNOSIS — R26.9 UNSPECIFIED ABNORMALITIES OF GAIT AND MOBILITY: ICD-10-CM

## 2025-05-13 DIAGNOSIS — M06.9 RHEUMATOID ARTHRITIS, UNSPECIFIED: ICD-10-CM

## 2025-05-13 DIAGNOSIS — Z79.01 LONG TERM (CURRENT) USE OF ANTICOAGULANTS: ICD-10-CM

## 2025-05-13 DIAGNOSIS — M47.816 SPONDYLOSIS WITHOUT MYELOPATHY OR RADICULOPATHY, LUMBAR REGION: ICD-10-CM

## 2025-05-13 DIAGNOSIS — E83.52 HYPERCALCEMIA: ICD-10-CM

## 2025-05-13 DIAGNOSIS — Z66 DO NOT RESUSCITATE: ICD-10-CM

## 2025-05-13 DIAGNOSIS — Z92.3 PERSONAL HISTORY OF IRRADIATION: ICD-10-CM

## 2025-05-28 ENCOUNTER — APPOINTMENT (OUTPATIENT)
Dept: PAIN MANAGEMENT | Facility: CLINIC | Age: 85
End: 2025-05-28
Payer: MEDICARE

## 2025-05-28 DIAGNOSIS — Z86.79 PERSONAL HISTORY OF OTHER DISEASES OF THE CIRCULATORY SYSTEM: ICD-10-CM

## 2025-05-28 DIAGNOSIS — Z85.46 PERSONAL HISTORY OF MALIGNANT NEOPLASM OF PROSTATE: ICD-10-CM

## 2025-05-28 DIAGNOSIS — M25.552 PAIN IN LEFT HIP: ICD-10-CM

## 2025-05-28 DIAGNOSIS — M16.12 UNILATERAL PRIMARY OSTEOARTHRITIS, LEFT HIP: ICD-10-CM

## 2025-05-28 DIAGNOSIS — Z86.39 PERSONAL HISTORY OF OTHER ENDOCRINE, NUTRITIONAL AND METABOLIC DISEASE: ICD-10-CM

## 2025-05-28 DIAGNOSIS — Z78.9 OTHER SPECIFIED HEALTH STATUS: ICD-10-CM

## 2025-05-28 PROBLEM — Z00.00 ENCOUNTER FOR PREVENTIVE HEALTH EXAMINATION: Status: ACTIVE | Noted: 2025-05-28

## 2025-05-28 PROCEDURE — 99204 OFFICE O/P NEW MOD 45 MIN: CPT

## 2025-05-28 RX ORDER — DULOXETINE HYDROCHLORIDE 30 MG/1
30 CAPSULE, DELAYED RELEASE PELLETS ORAL
Qty: 60 | Refills: 2 | Status: ACTIVE | COMMUNITY
Start: 2025-05-28 | End: 1900-01-01

## 2025-05-28 RX ORDER — APIXABAN 5 MG/1
5 TABLET, FILM COATED ORAL
Refills: 0 | Status: ACTIVE | COMMUNITY

## 2025-05-29 PROBLEM — M25.552 ACUTE PAIN OF LEFT HIP: Status: ACTIVE | Noted: 2025-05-29

## 2025-05-29 PROBLEM — M16.12 PRIMARY OSTEOARTHRITIS OF LEFT HIP: Status: ACTIVE | Noted: 2025-05-28

## 2025-06-09 ENCOUNTER — APPOINTMENT (OUTPATIENT)
Dept: PAIN MANAGEMENT | Facility: CLINIC | Age: 85
End: 2025-06-09

## 2025-06-25 ENCOUNTER — APPOINTMENT (OUTPATIENT)
Dept: PAIN MANAGEMENT | Facility: CLINIC | Age: 85
End: 2025-06-25

## 2025-07-03 ENCOUNTER — APPOINTMENT (OUTPATIENT)
Facility: CLINIC | Age: 85
End: 2025-07-03
Payer: MEDICARE

## 2025-07-03 PROCEDURE — 99203 OFFICE O/P NEW LOW 30 MIN: CPT

## 2025-07-22 ENCOUNTER — RESULT REVIEW (OUTPATIENT)
Age: 85
End: 2025-07-22

## 2025-07-22 ENCOUNTER — OUTPATIENT (OUTPATIENT)
Dept: OUTPATIENT SERVICES | Facility: HOSPITAL | Age: 85
LOS: 1 days | End: 2025-07-22
Payer: MEDICARE

## 2025-07-22 VITALS
TEMPERATURE: 96 F | RESPIRATION RATE: 16 BRPM | WEIGHT: 235.01 LBS | SYSTOLIC BLOOD PRESSURE: 129 MMHG | OXYGEN SATURATION: 98 % | DIASTOLIC BLOOD PRESSURE: 77 MMHG | HEART RATE: 75 BPM | HEIGHT: 71 IN

## 2025-07-22 DIAGNOSIS — Z01.818 ENCOUNTER FOR OTHER PREPROCEDURAL EXAMINATION: ICD-10-CM

## 2025-07-22 DIAGNOSIS — M16.12 UNILATERAL PRIMARY OSTEOARTHRITIS, LEFT HIP: ICD-10-CM

## 2025-07-22 DIAGNOSIS — Z98.49 CATARACT EXTRACTION STATUS, UNSPECIFIED EYE: Chronic | ICD-10-CM

## 2025-07-22 LAB
A1C WITH ESTIMATED AVERAGE GLUCOSE RESULT: 6.2 % — HIGH (ref 4–5.6)
ALBUMIN SERPL ELPH-MCNC: 4.4 G/DL — SIGNIFICANT CHANGE UP (ref 3.5–5.2)
ALP SERPL-CCNC: 54 U/L — SIGNIFICANT CHANGE UP (ref 30–115)
ALT FLD-CCNC: 12 U/L — SIGNIFICANT CHANGE UP (ref 0–41)
ANION GAP SERPL CALC-SCNC: 11 MMOL/L — SIGNIFICANT CHANGE UP (ref 7–14)
APTT BLD: 35.1 SEC — SIGNIFICANT CHANGE UP (ref 27–39.2)
AST SERPL-CCNC: 18 U/L — SIGNIFICANT CHANGE UP (ref 0–41)
BASOPHILS # BLD AUTO: 0.05 K/UL — SIGNIFICANT CHANGE UP (ref 0–0.2)
BASOPHILS NFR BLD AUTO: 0.8 % — SIGNIFICANT CHANGE UP (ref 0–1)
BILIRUB SERPL-MCNC: 0.8 MG/DL — SIGNIFICANT CHANGE UP (ref 0.2–1.2)
BLD GP AB SCN SERPL QL: SIGNIFICANT CHANGE UP
BUN SERPL-MCNC: 25 MG/DL — HIGH (ref 10–20)
CALCIUM SERPL-MCNC: 10.5 MG/DL — SIGNIFICANT CHANGE UP (ref 8.4–10.5)
CHLORIDE SERPL-SCNC: 106 MMOL/L — SIGNIFICANT CHANGE UP (ref 98–110)
CO2 SERPL-SCNC: 24 MMOL/L — SIGNIFICANT CHANGE UP (ref 17–32)
CREAT SERPL-MCNC: 1.2 MG/DL — SIGNIFICANT CHANGE UP (ref 0.7–1.5)
EGFR: 59 ML/MIN/1.73M2 — LOW
EGFR: 59 ML/MIN/1.73M2 — LOW
EOSINOPHIL # BLD AUTO: 0.28 K/UL — SIGNIFICANT CHANGE UP (ref 0–0.7)
EOSINOPHIL NFR BLD AUTO: 4.3 % — SIGNIFICANT CHANGE UP (ref 0–8)
ESTIMATED AVERAGE GLUCOSE: 131 MG/DL — HIGH (ref 68–114)
GLUCOSE SERPL-MCNC: 107 MG/DL — HIGH (ref 70–99)
HCT VFR BLD CALC: 41.1 % — LOW (ref 42–52)
HGB BLD-MCNC: 13.6 G/DL — LOW (ref 14–18)
IMM GRANULOCYTES NFR BLD AUTO: 0.9 % — HIGH (ref 0.1–0.3)
INR BLD: 1.1 RATIO — SIGNIFICANT CHANGE UP (ref 0.65–1.3)
LYMPHOCYTES # BLD AUTO: 0.97 K/UL — LOW (ref 1.2–3.4)
LYMPHOCYTES # BLD AUTO: 14.8 % — LOW (ref 20.5–51.1)
MCHC RBC-ENTMCNC: 30 PG — SIGNIFICANT CHANGE UP (ref 27–31)
MCHC RBC-ENTMCNC: 33.1 G/DL — SIGNIFICANT CHANGE UP (ref 32–37)
MCV RBC AUTO: 90.5 FL — SIGNIFICANT CHANGE UP (ref 80–94)
MONOCYTES # BLD AUTO: 0.65 K/UL — HIGH (ref 0.1–0.6)
MONOCYTES NFR BLD AUTO: 9.9 % — HIGH (ref 1.7–9.3)
MRSA PCR RESULT.: NEGATIVE — SIGNIFICANT CHANGE UP
NEUTROPHILS # BLD AUTO: 4.56 K/UL — SIGNIFICANT CHANGE UP (ref 1.4–6.5)
NEUTROPHILS NFR BLD AUTO: 69.3 % — SIGNIFICANT CHANGE UP (ref 42.2–75.2)
NRBC BLD AUTO-RTO: 0 /100 WBCS — SIGNIFICANT CHANGE UP (ref 0–0)
PLATELET # BLD AUTO: 206 K/UL — SIGNIFICANT CHANGE UP (ref 130–400)
PMV BLD: 9.1 FL — SIGNIFICANT CHANGE UP (ref 7.4–10.4)
POTASSIUM SERPL-MCNC: 4.9 MMOL/L — SIGNIFICANT CHANGE UP (ref 3.5–5)
POTASSIUM SERPL-SCNC: 4.9 MMOL/L — SIGNIFICANT CHANGE UP (ref 3.5–5)
PROT SERPL-MCNC: 7.1 G/DL — SIGNIFICANT CHANGE UP (ref 6–8)
PROTHROM AB SERPL-ACNC: 13 SEC — HIGH (ref 9.95–12.87)
RBC # BLD: 4.54 M/UL — LOW (ref 4.7–6.1)
RBC # FLD: 13.4 % — SIGNIFICANT CHANGE UP (ref 11.5–14.5)
SODIUM SERPL-SCNC: 141 MMOL/L — SIGNIFICANT CHANGE UP (ref 135–146)
WBC # BLD: 6.57 K/UL — SIGNIFICANT CHANGE UP (ref 4.8–10.8)
WBC # FLD AUTO: 6.57 K/UL — SIGNIFICANT CHANGE UP (ref 4.8–10.8)

## 2025-07-22 PROCEDURE — 80053 COMPREHEN METABOLIC PANEL: CPT

## 2025-07-22 PROCEDURE — 85730 THROMBOPLASTIN TIME PARTIAL: CPT

## 2025-07-22 PROCEDURE — 85025 COMPLETE CBC W/AUTO DIFF WBC: CPT

## 2025-07-22 PROCEDURE — 87640 STAPH A DNA AMP PROBE: CPT

## 2025-07-22 PROCEDURE — 93005 ELECTROCARDIOGRAM TRACING: CPT

## 2025-07-22 PROCEDURE — 93010 ELECTROCARDIOGRAM REPORT: CPT

## 2025-07-22 PROCEDURE — 36415 COLL VENOUS BLD VENIPUNCTURE: CPT

## 2025-07-22 PROCEDURE — 86850 RBC ANTIBODY SCREEN: CPT

## 2025-07-22 PROCEDURE — 85610 PROTHROMBIN TIME: CPT

## 2025-07-22 PROCEDURE — 86900 BLOOD TYPING SEROLOGIC ABO: CPT

## 2025-07-22 PROCEDURE — 87641 MR-STAPH DNA AMP PROBE: CPT

## 2025-07-22 PROCEDURE — 86901 BLOOD TYPING SEROLOGIC RH(D): CPT

## 2025-07-22 PROCEDURE — 99214 OFFICE O/P EST MOD 30 MIN: CPT | Mod: 25

## 2025-07-22 PROCEDURE — 73502 X-RAY EXAM HIP UNI 2-3 VIEWS: CPT | Mod: LT

## 2025-07-22 PROCEDURE — 73502 X-RAY EXAM HIP UNI 2-3 VIEWS: CPT | Mod: 26,LT

## 2025-07-22 PROCEDURE — 83036 HEMOGLOBIN GLYCOSYLATED A1C: CPT

## 2025-07-23 DIAGNOSIS — Z01.818 ENCOUNTER FOR OTHER PREPROCEDURAL EXAMINATION: ICD-10-CM

## 2025-07-23 DIAGNOSIS — M16.12 UNILATERAL PRIMARY OSTEOARTHRITIS, LEFT HIP: ICD-10-CM

## 2025-07-23 LAB
BASOPHILS # BLD AUTO: 0.04 K/UL — SIGNIFICANT CHANGE UP (ref 0–0.2)
BASOPHILS NFR BLD AUTO: 0.6 % — SIGNIFICANT CHANGE UP (ref 0–2)
EOSINOPHIL # BLD AUTO: 0.28 K/UL — SIGNIFICANT CHANGE UP (ref 0–0.5)
EOSINOPHIL NFR BLD AUTO: 4.1 % — SIGNIFICANT CHANGE UP (ref 0–6)
HCT VFR BLD CALC: 43.3 % — SIGNIFICANT CHANGE UP (ref 39–50)
HGB BLD-MCNC: 13.8 G/DL — SIGNIFICANT CHANGE UP (ref 13–17)
IMM GRANULOCYTES NFR BLD AUTO: 0.7 % — SIGNIFICANT CHANGE UP (ref 0–0.9)
LYMPHOCYTES # BLD AUTO: 0.98 K/UL — LOW (ref 1–3.3)
LYMPHOCYTES # BLD AUTO: 14.5 % — SIGNIFICANT CHANGE UP (ref 13–44)
MCHC RBC-ENTMCNC: 30.2 PG — SIGNIFICANT CHANGE UP (ref 27–34)
MCHC RBC-ENTMCNC: 31.9 G/DL — LOW (ref 32–36)
MCV RBC AUTO: 94.7 FL — SIGNIFICANT CHANGE UP (ref 80–100)
MONOCYTES # BLD AUTO: 0.61 K/UL — SIGNIFICANT CHANGE UP (ref 0–0.9)
MONOCYTES NFR BLD AUTO: 9 % — SIGNIFICANT CHANGE UP (ref 2–14)
NEUTROPHILS # BLD AUTO: 4.82 K/UL — SIGNIFICANT CHANGE UP (ref 1.8–7.4)
NEUTROPHILS NFR BLD AUTO: 71.1 % — SIGNIFICANT CHANGE UP (ref 43–77)
PLATELET # BLD AUTO: 207 K/UL — SIGNIFICANT CHANGE UP (ref 150–400)
RBC # BLD: 4.57 M/UL — SIGNIFICANT CHANGE UP (ref 4.2–5.8)
RBC # FLD: 14.1 % — SIGNIFICANT CHANGE UP (ref 10.3–14.5)
WBC # BLD: 6.78 K/UL — SIGNIFICANT CHANGE UP (ref 3.8–10.5)
WBC # FLD AUTO: 6.78 K/UL — SIGNIFICANT CHANGE UP (ref 3.8–10.5)

## 2025-08-06 ENCOUNTER — APPOINTMENT (OUTPATIENT)
Dept: ORTHOPEDIC SURGERY | Facility: HOSPITAL | Age: 85
End: 2025-08-06

## 2025-08-06 ENCOUNTER — TRANSCRIPTION ENCOUNTER (OUTPATIENT)
Age: 85
End: 2025-08-06

## 2025-08-06 ENCOUNTER — RESULT REVIEW (OUTPATIENT)
Age: 85
End: 2025-08-06

## 2025-08-06 ENCOUNTER — INPATIENT (INPATIENT)
Facility: HOSPITAL | Age: 85
LOS: 0 days | Discharge: HOME | DRG: 554 | End: 2025-08-07
Attending: ORTHOPAEDIC SURGERY | Admitting: ORTHOPAEDIC SURGERY
Payer: MEDICARE

## 2025-08-06 VITALS
RESPIRATION RATE: 20 BRPM | OXYGEN SATURATION: 99 % | SYSTOLIC BLOOD PRESSURE: 154 MMHG | TEMPERATURE: 98 F | HEART RATE: 78 BPM | WEIGHT: 244.93 LBS | HEIGHT: 71 IN | DIASTOLIC BLOOD PRESSURE: 73 MMHG

## 2025-08-06 DIAGNOSIS — Z98.49 CATARACT EXTRACTION STATUS, UNSPECIFIED EYE: Chronic | ICD-10-CM

## 2025-08-06 DIAGNOSIS — M16.12 UNILATERAL PRIMARY OSTEOARTHRITIS, LEFT HIP: ICD-10-CM

## 2025-08-06 LAB
GLUCOSE BLDC GLUCOMTR-MCNC: 134 MG/DL — HIGH (ref 70–99)
GLUCOSE BLDC GLUCOMTR-MCNC: 134 MG/DL — HIGH (ref 70–99)

## 2025-08-06 PROCEDURE — 85027 COMPLETE CBC AUTOMATED: CPT

## 2025-08-06 PROCEDURE — 97110 THERAPEUTIC EXERCISES: CPT | Mod: GP

## 2025-08-06 PROCEDURE — 36415 COLL VENOUS BLD VENIPUNCTURE: CPT

## 2025-08-06 PROCEDURE — 88311 DECALCIFY TISSUE: CPT | Mod: 26

## 2025-08-06 PROCEDURE — 27130 TOTAL HIP ARTHROPLASTY: CPT | Mod: LT

## 2025-08-06 PROCEDURE — 94010 BREATHING CAPACITY TEST: CPT

## 2025-08-06 PROCEDURE — 82962 GLUCOSE BLOOD TEST: CPT

## 2025-08-06 PROCEDURE — 97535 SELF CARE MNGMENT TRAINING: CPT | Mod: GO

## 2025-08-06 PROCEDURE — 88305 TISSUE EXAM BY PATHOLOGIST: CPT | Mod: 26

## 2025-08-06 PROCEDURE — 80048 BASIC METABOLIC PNL TOTAL CA: CPT

## 2025-08-06 PROCEDURE — 97116 GAIT TRAINING THERAPY: CPT | Mod: GP

## 2025-08-06 RX ORDER — MAGNESIUM HYDROXIDE 400 MG/5ML
30 SUSPENSION ORAL DAILY
Refills: 0 | Status: DISCONTINUED | OUTPATIENT
Start: 2025-08-06 | End: 2025-08-07

## 2025-08-06 RX ORDER — DEXTROSE 50 % IN WATER 50 %
25 SYRINGE (ML) INTRAVENOUS ONCE
Refills: 0 | Status: DISCONTINUED | OUTPATIENT
Start: 2025-08-06 | End: 2025-08-07

## 2025-08-06 RX ORDER — SENNA 187 MG
2 TABLET ORAL AT BEDTIME
Refills: 0 | Status: DISCONTINUED | OUTPATIENT
Start: 2025-08-06 | End: 2025-08-07

## 2025-08-06 RX ORDER — SODIUM CHLORIDE 9 G/1000ML
1000 INJECTION, SOLUTION INTRAVENOUS
Refills: 0 | Status: DISCONTINUED | OUTPATIENT
Start: 2025-08-06 | End: 2025-08-07

## 2025-08-06 RX ORDER — ATORVASTATIN CALCIUM 80 MG/1
20 TABLET, FILM COATED ORAL AT BEDTIME
Refills: 0 | Status: DISCONTINUED | OUTPATIENT
Start: 2025-08-06 | End: 2025-08-07

## 2025-08-06 RX ORDER — ACETAMINOPHEN 500 MG/5ML
1000 LIQUID (ML) ORAL ONCE
Refills: 0 | Status: COMPLETED | OUTPATIENT
Start: 2025-08-06 | End: 2025-08-06

## 2025-08-06 RX ORDER — DEXTROSE 50 % IN WATER 50 %
12.5 SYRINGE (ML) INTRAVENOUS ONCE
Refills: 0 | Status: DISCONTINUED | OUTPATIENT
Start: 2025-08-06 | End: 2025-08-07

## 2025-08-06 RX ORDER — CEFAZOLIN SODIUM IN 0.9 % NACL 3 G/100 ML
2000 INTRAVENOUS SOLUTION, PIGGYBACK (ML) INTRAVENOUS EVERY 8 HOURS
Refills: 0 | Status: DISCONTINUED | OUTPATIENT
Start: 2025-08-06 | End: 2025-08-07

## 2025-08-06 RX ORDER — OXYCODONE HYDROCHLORIDE 30 MG/1
5 TABLET ORAL ONCE
Refills: 0 | Status: DISCONTINUED | OUTPATIENT
Start: 2025-08-06 | End: 2025-08-06

## 2025-08-06 RX ORDER — GLUCAGON 3 MG/1
1 POWDER NASAL ONCE
Refills: 0 | Status: DISCONTINUED | OUTPATIENT
Start: 2025-08-06 | End: 2025-08-07

## 2025-08-06 RX ORDER — MAGNESIUM, ALUMINUM HYDROXIDE 200-200 MG
30 TABLET,CHEWABLE ORAL
Refills: 0 | Status: DISCONTINUED | OUTPATIENT
Start: 2025-08-06 | End: 2025-08-07

## 2025-08-06 RX ORDER — HYDROMORPHONE/SOD CHLOR,ISO/PF 2 MG/10 ML
0.5 SYRINGE (ML) INJECTION
Refills: 0 | Status: DISCONTINUED | OUTPATIENT
Start: 2025-08-06 | End: 2025-08-06

## 2025-08-06 RX ORDER — CELECOXIB 50 MG/1
200 CAPSULE ORAL EVERY 12 HOURS
Refills: 0 | Status: DISCONTINUED | OUTPATIENT
Start: 2025-08-07 | End: 2025-08-07

## 2025-08-06 RX ORDER — TAMSULOSIN HYDROCHLORIDE 0.4 MG/1
0.4 CAPSULE ORAL AT BEDTIME
Refills: 0 | Status: DISCONTINUED | OUTPATIENT
Start: 2025-08-06 | End: 2025-08-07

## 2025-08-06 RX ORDER — DEXTROSE 50 % IN WATER 50 %
15 SYRINGE (ML) INTRAVENOUS ONCE
Refills: 0 | Status: DISCONTINUED | OUTPATIENT
Start: 2025-08-06 | End: 2025-08-07

## 2025-08-06 RX ORDER — SODIUM CHLORIDE 9 G/1000ML
1000 INJECTION, SOLUTION INTRAVENOUS
Refills: 0 | Status: DISCONTINUED | OUTPATIENT
Start: 2025-08-06 | End: 2025-08-06

## 2025-08-06 RX ORDER — INSULIN LISPRO 100 U/ML
INJECTION, SOLUTION INTRAVENOUS; SUBCUTANEOUS
Refills: 0 | Status: DISCONTINUED | OUTPATIENT
Start: 2025-08-06 | End: 2025-08-07

## 2025-08-06 RX ORDER — TRAMADOL HYDROCHLORIDE 50 MG/1
50 TABLET, FILM COATED ORAL EVERY 4 HOURS
Refills: 0 | Status: DISCONTINUED | OUTPATIENT
Start: 2025-08-06 | End: 2025-08-07

## 2025-08-06 RX ORDER — ONDANSETRON HCL/PF 4 MG/2 ML
4 VIAL (ML) INJECTION EVERY 6 HOURS
Refills: 0 | Status: DISCONTINUED | OUTPATIENT
Start: 2025-08-06 | End: 2025-08-07

## 2025-08-06 RX ORDER — METOPROLOL SUCCINATE 50 MG/1
25 TABLET, EXTENDED RELEASE ORAL
Refills: 0 | Status: DISCONTINUED | OUTPATIENT
Start: 2025-08-06 | End: 2025-08-07

## 2025-08-06 RX ORDER — AMLODIPINE BESYLATE 10 MG/1
5 TABLET ORAL DAILY
Refills: 0 | Status: DISCONTINUED | OUTPATIENT
Start: 2025-08-06 | End: 2025-08-07

## 2025-08-06 RX ORDER — ACETAMINOPHEN 500 MG/5ML
650 LIQUID (ML) ORAL EVERY 6 HOURS
Refills: 0 | Status: DISCONTINUED | OUTPATIENT
Start: 2025-08-06 | End: 2025-08-07

## 2025-08-06 RX ORDER — APIXABAN 5 MG/1
2.5 TABLET, FILM COATED ORAL
Refills: 0 | Status: DISCONTINUED | OUTPATIENT
Start: 2025-08-07 | End: 2025-08-07

## 2025-08-06 RX ORDER — POLYETHYLENE GLYCOL 3350 17 G/17G
17 POWDER, FOR SOLUTION ORAL AT BEDTIME
Refills: 0 | Status: DISCONTINUED | OUTPATIENT
Start: 2025-08-06 | End: 2025-08-07

## 2025-08-06 RX ORDER — KETOROLAC TROMETHAMINE 30 MG/ML
15 INJECTION, SOLUTION INTRAMUSCULAR; INTRAVENOUS EVERY 6 HOURS
Refills: 0 | Status: DISCONTINUED | OUTPATIENT
Start: 2025-08-06 | End: 2025-08-07

## 2025-08-06 RX ADMIN — TAMSULOSIN HYDROCHLORIDE 0.4 MILLIGRAM(S): 0.4 CAPSULE ORAL at 22:16

## 2025-08-06 RX ADMIN — ATORVASTATIN CALCIUM 20 MILLIGRAM(S): 80 TABLET, FILM COATED ORAL at 22:16

## 2025-08-06 RX ADMIN — KETOROLAC TROMETHAMINE 15 MILLIGRAM(S): 30 INJECTION, SOLUTION INTRAMUSCULAR; INTRAVENOUS at 16:07

## 2025-08-06 RX ADMIN — POLYETHYLENE GLYCOL 3350 17 GRAM(S): 17 POWDER, FOR SOLUTION ORAL at 22:16

## 2025-08-06 RX ADMIN — Medication 650 MILLIGRAM(S): at 16:06

## 2025-08-06 RX ADMIN — Medication 650 MILLIGRAM(S): at 16:50

## 2025-08-06 RX ADMIN — Medication 100 MILLIGRAM(S): at 18:32

## 2025-08-06 RX ADMIN — KETOROLAC TROMETHAMINE 15 MILLIGRAM(S): 30 INJECTION, SOLUTION INTRAMUSCULAR; INTRAVENOUS at 16:50

## 2025-08-06 RX ADMIN — Medication 2 TABLET(S): at 22:16

## 2025-08-06 RX ADMIN — KETOROLAC TROMETHAMINE 15 MILLIGRAM(S): 30 INJECTION, SOLUTION INTRAMUSCULAR; INTRAVENOUS at 20:09

## 2025-08-06 RX ADMIN — Medication 650 MILLIGRAM(S): at 20:09

## 2025-08-06 RX ADMIN — Medication 1000 MILLIGRAM(S): at 12:46

## 2025-08-06 RX ADMIN — METOPROLOL SUCCINATE 25 MILLIGRAM(S): 50 TABLET, EXTENDED RELEASE ORAL at 18:24

## 2025-08-06 RX ADMIN — Medication 100 MILLILITER(S): at 20:08

## 2025-08-06 RX ADMIN — KETOROLAC TROMETHAMINE 15 MILLIGRAM(S): 30 INJECTION, SOLUTION INTRAMUSCULAR; INTRAVENOUS at 20:40

## 2025-08-06 RX ADMIN — Medication 650 MILLIGRAM(S): at 20:40

## 2025-08-06 RX ADMIN — Medication 1000 MILLIGRAM(S): at 07:24

## 2025-08-07 ENCOUNTER — TRANSCRIPTION ENCOUNTER (OUTPATIENT)
Age: 85
End: 2025-08-07

## 2025-08-07 VITALS
RESPIRATION RATE: 16 BRPM | DIASTOLIC BLOOD PRESSURE: 74 MMHG | SYSTOLIC BLOOD PRESSURE: 130 MMHG | HEART RATE: 92 BPM | TEMPERATURE: 99 F | OXYGEN SATURATION: 97 %

## 2025-08-07 LAB
ANION GAP SERPL CALC-SCNC: 12 MMOL/L — SIGNIFICANT CHANGE UP (ref 7–14)
BUN SERPL-MCNC: 29 MG/DL — HIGH (ref 10–20)
CALCIUM SERPL-MCNC: 10 MG/DL — SIGNIFICANT CHANGE UP (ref 8.4–10.5)
CHLORIDE SERPL-SCNC: 106 MMOL/L — SIGNIFICANT CHANGE UP (ref 98–110)
CO2 SERPL-SCNC: 21 MMOL/L — SIGNIFICANT CHANGE UP (ref 17–32)
CREAT SERPL-MCNC: 1.5 MG/DL — SIGNIFICANT CHANGE UP (ref 0.7–1.5)
EGFR: 45 ML/MIN/1.73M2 — LOW
EGFR: 45 ML/MIN/1.73M2 — LOW
GLUCOSE BLDC GLUCOMTR-MCNC: 159 MG/DL — HIGH (ref 70–99)
GLUCOSE SERPL-MCNC: 131 MG/DL — HIGH (ref 70–99)
HCT VFR BLD CALC: 37.4 % — LOW (ref 39–50)
HGB BLD-MCNC: 12.4 G/DL — LOW (ref 13–17)
MCHC RBC-ENTMCNC: 30.5 PG — SIGNIFICANT CHANGE UP (ref 27–34)
MCHC RBC-ENTMCNC: 33.2 G/DL — SIGNIFICANT CHANGE UP (ref 32–36)
MCV RBC AUTO: 91.9 FL — SIGNIFICANT CHANGE UP (ref 80–100)
NRBC # BLD AUTO: 0 K/UL — SIGNIFICANT CHANGE UP (ref 0–0)
NRBC # FLD: 0 K/UL — SIGNIFICANT CHANGE UP (ref 0–0)
NRBC BLD AUTO-RTO: 0 /100 WBCS — SIGNIFICANT CHANGE UP (ref 0–0)
PLATELET # BLD AUTO: 189 K/UL — SIGNIFICANT CHANGE UP (ref 150–400)
PMV BLD: 9.3 FL — SIGNIFICANT CHANGE UP (ref 7–13)
POTASSIUM SERPL-MCNC: 4.6 MMOL/L — SIGNIFICANT CHANGE UP (ref 3.5–5)
POTASSIUM SERPL-SCNC: 4.6 MMOL/L — SIGNIFICANT CHANGE UP (ref 3.5–5)
RBC # BLD: 4.07 M/UL — LOW (ref 4.2–5.8)
RBC # FLD: 13.2 % — SIGNIFICANT CHANGE UP (ref 10.3–14.5)
SODIUM SERPL-SCNC: 139 MMOL/L — SIGNIFICANT CHANGE UP (ref 135–146)
WBC # BLD: 12.13 K/UL — HIGH (ref 3.8–10.5)
WBC # FLD AUTO: 12.13 K/UL — HIGH (ref 3.8–10.5)

## 2025-08-07 PROCEDURE — 99222 1ST HOSP IP/OBS MODERATE 55: CPT

## 2025-08-07 RX ORDER — ACETAMINOPHEN 500 MG/5ML
2 LIQUID (ML) ORAL
Qty: 112 | Refills: 0
Start: 2025-08-07 | End: 2025-08-20

## 2025-08-07 RX ORDER — IBUPROFEN 200 MG
1 TABLET ORAL
Qty: 21 | Refills: 0
Start: 2025-08-07 | End: 2025-08-13

## 2025-08-07 RX ORDER — TRAMADOL HYDROCHLORIDE 50 MG/1
1 TABLET, FILM COATED ORAL
Qty: 20 | Refills: 0
Start: 2025-08-07 | End: 2025-08-11

## 2025-08-07 RX ORDER — SENNA 187 MG
2 TABLET ORAL
Qty: 28 | Refills: 0
Start: 2025-08-07 | End: 2025-08-20

## 2025-08-07 RX ORDER — CEFDINIR 250 MG/5ML
1 POWDER, FOR SUSPENSION ORAL
Qty: 10 | Refills: 0
Start: 2025-08-07 | End: 2025-08-11

## 2025-08-07 RX ADMIN — KETOROLAC TROMETHAMINE 15 MILLIGRAM(S): 30 INJECTION, SOLUTION INTRAMUSCULAR; INTRAVENOUS at 02:20

## 2025-08-07 RX ADMIN — Medication 100 MILLIGRAM(S): at 10:49

## 2025-08-07 RX ADMIN — Medication 40 MILLIGRAM(S): at 06:08

## 2025-08-07 RX ADMIN — CELECOXIB 200 MILLIGRAM(S): 50 CAPSULE ORAL at 13:21

## 2025-08-07 RX ADMIN — KETOROLAC TROMETHAMINE 15 MILLIGRAM(S): 30 INJECTION, SOLUTION INTRAMUSCULAR; INTRAVENOUS at 08:17

## 2025-08-07 RX ADMIN — Medication 650 MILLIGRAM(S): at 08:17

## 2025-08-07 RX ADMIN — METOPROLOL SUCCINATE 25 MILLIGRAM(S): 50 TABLET, EXTENDED RELEASE ORAL at 06:07

## 2025-08-07 RX ADMIN — CELECOXIB 200 MILLIGRAM(S): 50 CAPSULE ORAL at 13:39

## 2025-08-07 RX ADMIN — Medication 650 MILLIGRAM(S): at 02:21

## 2025-08-07 RX ADMIN — APIXABAN 2.5 MILLIGRAM(S): 5 TABLET, FILM COATED ORAL at 06:08

## 2025-08-07 RX ADMIN — Medication 650 MILLIGRAM(S): at 02:50

## 2025-08-07 RX ADMIN — Medication 100 MILLIGRAM(S): at 02:20

## 2025-08-07 RX ADMIN — Medication 650 MILLIGRAM(S): at 08:29

## 2025-08-07 RX ADMIN — KETOROLAC TROMETHAMINE 15 MILLIGRAM(S): 30 INJECTION, SOLUTION INTRAMUSCULAR; INTRAVENOUS at 08:30

## 2025-08-07 RX ADMIN — AMLODIPINE BESYLATE 5 MILLIGRAM(S): 10 TABLET ORAL at 06:08

## 2025-08-07 RX ADMIN — KETOROLAC TROMETHAMINE 15 MILLIGRAM(S): 30 INJECTION, SOLUTION INTRAMUSCULAR; INTRAVENOUS at 02:50

## 2025-08-07 RX ADMIN — Medication 1 APPLICATION(S): at 06:08

## 2025-08-12 DIAGNOSIS — E78.5 HYPERLIPIDEMIA, UNSPECIFIED: ICD-10-CM

## 2025-08-12 DIAGNOSIS — Z79.899 OTHER LONG TERM (CURRENT) DRUG THERAPY: ICD-10-CM

## 2025-08-12 DIAGNOSIS — Z85.46 PERSONAL HISTORY OF MALIGNANT NEOPLASM OF PROSTATE: ICD-10-CM

## 2025-08-12 DIAGNOSIS — Z98.42 CATARACT EXTRACTION STATUS, LEFT EYE: ICD-10-CM

## 2025-08-12 DIAGNOSIS — H26.9 UNSPECIFIED CATARACT: ICD-10-CM

## 2025-08-12 DIAGNOSIS — I48.20 CHRONIC ATRIAL FIBRILLATION, UNSPECIFIED: ICD-10-CM

## 2025-08-12 DIAGNOSIS — M16.12 UNILATERAL PRIMARY OSTEOARTHRITIS, LEFT HIP: ICD-10-CM

## 2025-08-12 DIAGNOSIS — Z79.01 LONG TERM (CURRENT) USE OF ANTICOAGULANTS: ICD-10-CM

## 2025-08-12 DIAGNOSIS — I10 ESSENTIAL (PRIMARY) HYPERTENSION: ICD-10-CM

## 2025-08-12 DIAGNOSIS — N40.0 BENIGN PROSTATIC HYPERPLASIA WITHOUT LOWER URINARY TRACT SYMPTOMS: ICD-10-CM

## 2025-08-12 LAB — SURGICAL PATHOLOGY STUDY: SIGNIFICANT CHANGE UP

## 2025-08-28 ENCOUNTER — APPOINTMENT (OUTPATIENT)
Facility: CLINIC | Age: 85
End: 2025-08-28
Payer: MEDICARE

## 2025-08-28 DIAGNOSIS — M16.12 UNILATERAL PRIMARY OSTEOARTHRITIS, LEFT HIP: ICD-10-CM

## 2025-08-28 PROCEDURE — 73560 X-RAY EXAM OF KNEE 1 OR 2: CPT | Mod: 50

## 2025-08-28 PROCEDURE — 73502 X-RAY EXAM HIP UNI 2-3 VIEWS: CPT

## 2025-08-28 PROCEDURE — 99024 POSTOP FOLLOW-UP VISIT: CPT

## 2025-09-11 ENCOUNTER — NON-APPOINTMENT (OUTPATIENT)
Age: 85
End: 2025-09-11